# Patient Record
Sex: FEMALE | Race: WHITE | NOT HISPANIC OR LATINO | Employment: FULL TIME | URBAN - METROPOLITAN AREA
[De-identification: names, ages, dates, MRNs, and addresses within clinical notes are randomized per-mention and may not be internally consistent; named-entity substitution may affect disease eponyms.]

---

## 2018-04-14 ENCOUNTER — OFFICE VISIT (OUTPATIENT)
Dept: URGENT CARE | Facility: CLINIC | Age: 33
End: 2018-04-14
Payer: COMMERCIAL

## 2018-04-14 VITALS
BODY MASS INDEX: 25.83 KG/M2 | TEMPERATURE: 99 F | SYSTOLIC BLOOD PRESSURE: 156 MMHG | HEIGHT: 65 IN | OXYGEN SATURATION: 97 % | RESPIRATION RATE: 20 BRPM | WEIGHT: 155 LBS | DIASTOLIC BLOOD PRESSURE: 100 MMHG | HEART RATE: 85 BPM

## 2018-04-14 DIAGNOSIS — N39.0 URINARY TRACT INFECTION WITH HEMATURIA, SITE UNSPECIFIED: Primary | ICD-10-CM

## 2018-04-14 DIAGNOSIS — R30.0 DYSURIA: ICD-10-CM

## 2018-04-14 DIAGNOSIS — R31.9 URINARY TRACT INFECTION WITH HEMATURIA, SITE UNSPECIFIED: Primary | ICD-10-CM

## 2018-04-14 LAB
B-HCG UR QL: NEGATIVE
BILIRUB UR QL STRIP: NEGATIVE
CTP QC/QA: YES
GLUCOSE UR QL STRIP: NEGATIVE
KETONES UR QL STRIP: NEGATIVE
LEUKOCYTE ESTERASE UR QL STRIP: POSITIVE
PH, POC UA: 5.5 (ref 5–8)
POC BLOOD, URINE: POSITIVE
POC NITRATES, URINE: NEGATIVE
PROT UR QL STRIP: POSITIVE
SP GR UR STRIP: 1.02 (ref 1–1.03)
UROBILINOGEN UR STRIP-ACNC: NORMAL (ref 0.1–1.1)

## 2018-04-14 PROCEDURE — 81003 URINALYSIS AUTO W/O SCOPE: CPT | Mod: QW,S$GLB,, | Performed by: NURSE PRACTITIONER

## 2018-04-14 PROCEDURE — 99203 OFFICE O/P NEW LOW 30 MIN: CPT | Mod: 25,S$GLB,, | Performed by: NURSE PRACTITIONER

## 2018-04-14 PROCEDURE — 81025 URINE PREGNANCY TEST: CPT | Mod: S$GLB,,, | Performed by: NURSE PRACTITIONER

## 2018-04-14 RX ORDER — PHENAZOPYRIDINE HYDROCHLORIDE 200 MG/1
200 TABLET, FILM COATED ORAL 3 TIMES DAILY PRN
Qty: 30 TABLET | Refills: 0 | Status: SHIPPED | OUTPATIENT
Start: 2018-04-14 | End: 2019-04-14

## 2018-04-14 RX ORDER — NITROFURANTOIN 25; 75 MG/1; MG/1
100 CAPSULE ORAL 2 TIMES DAILY
Qty: 14 CAPSULE | Refills: 0 | Status: SHIPPED | OUTPATIENT
Start: 2018-04-14 | End: 2018-04-21

## 2018-04-14 NOTE — PATIENT INSTRUCTIONS

## 2018-04-14 NOTE — PROGRESS NOTES
"Subjective:       Patient ID: Sade Mullins is a 32 y.o. female.    Vitals:  height is 5' 5" (1.651 m) and weight is 70.3 kg (155 lb). Her oral temperature is 98.5 °F (36.9 °C). Her blood pressure is 156/100 (abnormal) and her pulse is 85. Her respiration is 20 and oxygen saturation is 97%.     Chief Complaint: Urinary Tract Infection    Urinary Tract Infection    This is a new problem. The current episode started today. The problem occurs every urination. The problem has been gradually worsening. The quality of the pain is described as burning. The pain is at a severity of 8/10. The pain is moderate. There has been no fever. The fever has been present for less than 1 day. She is sexually active. There is no history of pyelonephritis. Associated symptoms include frequency and urgency. Pertinent negatives include no chills, hematuria, nausea or vomiting. She has tried nothing for the symptoms. The treatment provided no relief.     Review of Systems   Constitution: Negative for chills and fever.   Skin: Negative for itching.   Musculoskeletal: Positive for back pain.   Gastrointestinal: Negative for abdominal pain, nausea and vomiting.   Genitourinary: Positive for dysuria, frequency and urgency. Negative for genital sores, hematuria, missed menses and non-menstrual bleeding.       Objective:      Physical Exam   Constitutional: She is oriented to person, place, and time. She appears well-developed and well-nourished.   HENT:   Head: Normocephalic and atraumatic.   Nose: No nasal deformity. No epistaxis.   Mouth/Throat: Mucous membranes are normal.   Eyes: Conjunctivae and lids are normal.   Neck: Trachea normal, normal range of motion and phonation normal. Neck supple.   Cardiovascular: Normal rate, regular rhythm, normal heart sounds and normal pulses.    Pulmonary/Chest: Effort normal and breath sounds normal.   Abdominal: Soft. Normal appearance. She exhibits no distension and no mass. There is no tenderness. " "There is no CVA tenderness.   Genitourinary: No vaginal discharge found.   Neurological: She is alert and oriented to person, place, and time.   Skin: Skin is warm, dry and intact.   Psychiatric: She has a normal mood and affect. Her speech is normal and behavior is normal. Cognition and memory are normal.   Nursing note and vitals reviewed.      Assessment:       1. Urinary tract infection with hematuria, site unspecified    2. Dysuria        Plan:       Patient Instructions     Bladder Infection, Female (Adult)    Urine is normally doesn't have any bacteria in it. But bacteria can get into the urinary tract from the skin around the rectum. Or they can travel in the blood from elsewhere in the body. Once they are in your urinary tract, they can cause infection in the urethra (urethritis), the bladder (cystitis), or the kidneys (pyelonephritis).  The most common place for an infection is in the bladder. This is called a bladder infection. This is one of the most common infections in women. Most bladder infections are easily treated. They are not serious unless the infection spreads to the kidney.  The phrases "bladder infection," "UTI," and "cystitis" are often used to describe the same thing. But they are not always the same. Cystitis is an inflammation of the bladder. The most common cause of cystitis is an infection.  Symptoms  The infection causes inflammation in the urethra and bladder. This causes many of the symptoms. The most common symptoms of a bladder infection are:  · Pain or burning when urinating  · Having to urinate more often than usual  · Urgent need to urinate  · Only a small amount of urine comes out  · Blood in urine  · Abdominal discomfort. This is usually in the lower abdomen above the pubic bone.  · Cloudy urine  · Strong- or bad-smelling urine  · Unable to urinate (urinary retention)  · Unable to hold urine in (urinary incontinence)  · Fever  · Loss of appetite  · Confusion (in older " adults)  Causes  Bladder infections are not contagious. You can't get one from someone else, from a toilet seat, or from sharing a bath.  The most common cause of bladder infections is bacteria from the bowels. The bacteria get onto the skin around the opening of the urethra. From there, they can get into the urine and travel up to the bladder, causing inflammation and infection. This usually happens because of:  · Wiping improperly after urinating. Always wipe from front to back.  · Bowel incontinence  · Pregnancy  · Procedures such as having a catheter inserted  · Older age  · Not emptying your bladder. This can allow bacteria a chance to grow in your urine.  · Dehydration  · Constipation  · Sex  · Use of a diaphragm for birth control   Treatment  Bladder infections are diagnosed by a urine test. They are treated with antibiotics and usually clear up quickly without complications. Treatment helps prevent a more serious kidney infection.  Medicines  Medicines can help in the treatment of a bladder infection:  · Take antibiotics until they are used up, even if you feel better. It is important to finish them to make sure the infection has cleared.  · You can use acetaminophen or ibuprofen for pain, fever, or discomfort, unless another medicine was prescribed. If you have chronic liver or kidney disease, talk with your healthcare provider before using these medicines. Also talk with your provider if you've ever had a stomach ulcer or gastrointestinal bleeding, or are taking blood-thinner medicines.  · If you are given phenazopydridine to reduce burning with urination, it will cause your urine to become a bright orange color. This can stain clothing.  Care and prevention  These self-care steps can help prevent future infections:  · Drink plenty of fluids to prevent dehydration and flush out your bladder. Do this unless you must restrict fluids for other health reasons, or your doctor told you not to.  · Proper cleaning  after going to the bathroom is important. Wipe from front to back after using the toilet to prevent the spread of bacteria.  · Urinate more often. Don't try to hold urine in for a long time.  · Wear loose-fitting clothes and cotton underwear. Avoid tight-fitting pants.  · Improve your diet and prevent constipation. Eat more fresh fruit and vegetables, and fiber, and less junk and fatty foods.  · Avoid sex until your symptoms are gone.  · Avoid caffeine, alcohol, and spicy foods. These can irritate your bladder.  · Urinate right after intercourse to flush out your bladder.  · If you use birth control pills and have frequent bladder infections, discuss it with your doctor.  Follow-up care  Call your healthcare provider if all symptoms are not gone after 3 days of treatment. This is especially important if you have repeat infections.  If a culture was done, you will be told if your treatment needs to be changed. If directed, you can call to find out the results.  If X-rays were done, you will be told if the results will affect your treatment.  Call 911  Call 911 if any of the following occur:  · Trouble breathing  · Hard to wake up or confusion  · Fainting or loss of consciousness  · Rapid heart rate  When to seek medical advice  Call your healthcare provider right away if any of these occur:  · Fever of 100.4ºF (38.0ºC) or higher, or as directed by your healthcare provider  · Symptoms are not better by the third day of treatment  · Back or belly (abdominal) pain that gets worse  · Repeated vomiting, or unable to keep medicine down  · Weakness or dizziness  · Vaginal discharge  · Pain, redness, or swelling in the outer vaginal area (labia)  Date Last Reviewed: 10/1/2016  © 3757-2592 [employer]. [address] [place] [address], [place] [address]. All rights reserved. This information is not intended as a substitute for professional medical care. Always follow your healthcare professional's  instructions.              Urinary tract infection with hematuria, site unspecified    Dysuria  -     POCT Urinalysis, Dipstick, Automated, W/O Scope  -     POCT urine pregnancy  -     CULTURE, URINE    Other orders  -     nitrofurantoin, macrocrystal-monohydrate, (MACROBID) 100 MG capsule; Take 1 capsule (100 mg total) by mouth 2 (two) times daily.  Dispense: 14 capsule; Refill: 0  -     phenazopyridine (PYRIDIUM) 200 MG tablet; Take 1 tablet (200 mg total) by mouth 3 (three) times daily as needed for Pain.  Dispense: 30 tablet; Refill: 0

## 2018-04-20 LAB
BACTERIA UR CULT: ABNORMAL
BACTERIA UR CULT: ABNORMAL
OTHER ANTIBIOTIC SUSC ISLT: ABNORMAL

## 2018-04-22 ENCOUNTER — TELEPHONE (OUTPATIENT)
Dept: URGENT CARE | Facility: CLINIC | Age: 33
End: 2018-04-22

## 2018-04-24 ENCOUNTER — TELEPHONE (OUTPATIENT)
Dept: URGENT CARE | Facility: CLINIC | Age: 33
End: 2018-04-24

## 2018-04-24 NOTE — TELEPHONE ENCOUNTER
----- Message from Caroline Pope MD sent at 4/20/2018  9:43 AM CDT -----  Notify the patient that their  lab tests did result abnormal.Pt is on macrobid. However they  have already been given the appropriate medication and should finish the meds as prescribed

## 2018-06-19 ENCOUNTER — OFFICE VISIT (OUTPATIENT)
Dept: URGENT CARE | Facility: CLINIC | Age: 33
End: 2018-06-19
Payer: COMMERCIAL

## 2018-06-19 VITALS
HEIGHT: 66 IN | DIASTOLIC BLOOD PRESSURE: 98 MMHG | TEMPERATURE: 99 F | SYSTOLIC BLOOD PRESSURE: 132 MMHG | BODY MASS INDEX: 24.27 KG/M2 | OXYGEN SATURATION: 96 % | HEART RATE: 97 BPM | WEIGHT: 151 LBS

## 2018-06-19 DIAGNOSIS — H10.9 CONJUNCTIVITIS, UNSPECIFIED CONJUNCTIVITIS TYPE, UNSPECIFIED LATERALITY: Primary | ICD-10-CM

## 2018-06-19 DIAGNOSIS — H92.02 OTALGIA OF LEFT EAR: ICD-10-CM

## 2018-06-19 PROCEDURE — 99214 OFFICE O/P EST MOD 30 MIN: CPT | Mod: S$GLB,,, | Performed by: EMERGENCY MEDICINE

## 2018-06-19 PROCEDURE — 3008F BODY MASS INDEX DOCD: CPT | Mod: CPTII,S$GLB,, | Performed by: EMERGENCY MEDICINE

## 2018-06-19 RX ORDER — PHENTERMINE HYDROCHLORIDE 15 MG/1
8 CAPSULE ORAL
COMMUNITY
End: 2022-09-02

## 2018-06-19 RX ORDER — ERYTHROMYCIN 5 MG/G
OINTMENT OPHTHALMIC
Qty: 1 TUBE | Refills: 0 | Status: SHIPPED | OUTPATIENT
Start: 2018-06-19 | End: 2022-04-14

## 2018-06-19 RX ORDER — POLYMYXIN B SULFATE AND TRIMETHOPRIM 1; 10000 MG/ML; [USP'U]/ML
SOLUTION OPHTHALMIC
Qty: 1 BOTTLE | Refills: 0 | Status: SHIPPED | OUTPATIENT
Start: 2018-06-19 | End: 2022-04-14 | Stop reason: ALTCHOICE

## 2018-06-19 RX ORDER — ETONOGESTREL AND ETHINYL ESTRADIOL .12; .015 MG/D; MG/D
INSERT, EXTENDED RELEASE VAGINAL
Refills: 12 | COMMUNITY
Start: 2018-04-06 | End: 2022-04-14

## 2018-06-19 NOTE — PATIENT INSTRUCTIONS
EYE   If your condition worsens or fails to improve we recommend that you receive another evaluation at the ER immediately or contact your PCP to discuss your concerns or return here. You must understand that you've received an urgent care treatment only and that you may be released before all your medical problems are known or treated. You the patient will arrange for followup care as instructed.   Use the eye drops every 2 hours while awake initially. Once the eye redness decreases after a few days then decrease the frequency to three times a day. Use the eye drops for 24 hours after the last day of eye symptoms.   Do not wear your contact lens ( if you use them) for at least 5 days after you stop having symptoms and are rechecked by your doctor. Throw away the contacts, contact solution and carrying case you were using and start with new material.   If you develop increase eye symptoms or change in your vision seek medical care immediately either with your ophthalomologist or the ER or return here.   If you just need something for nasal drainage zyrtec, claritin or allegra (generics ok) just use these meds. You can also add flonase nasal spray if you have no contraindication to using it. If you are flying it would be good to get some afrin nose drops just for the plane trip. Use it after you are seat belting in and upon the descent if it is a long flight  If you have more stopped up sensation then use a decongestant like pseudoephedrine that you sign for behind the counter. You don't need a prescription. Do not use the phenylephrine/sudafed that is available on the shelf.  If you have both drainage and stopped up sinus sensation then you can get zyrtec D, claritin D or allegra D which you have to sign for behind the counter (generic ok).  If you have had allergies or side effects from using these meds in the past or contraindications like hypertension then do not use  the decongestants.  These choices are better than the combination cold medications on the shelf

## 2018-06-19 NOTE — PROGRESS NOTES
"Subjective:       Patient ID: Sade Mullins is a 32 y.o. female.    Vitals:  height is 5' 5.5" (1.664 m) and weight is 68.5 kg (151 lb). Her temperature is 98.6 °F (37 °C). Her blood pressure is 132/98 (abnormal) and her pulse is 97. Her oxygen saturation is 96%.     Chief Complaint: Otalgia and Eye Pain (redness)    Right eye redness and crusting since yesterday. She wears contacts weekly and changed them Sunday. She doesn't sleep with them.   She has had problems with left ear popping intermittently for one month and now slightly painful. She is flying to [place] in a week and wanted it checked. No other URI sxs      Otalgia    There is pain in the left ear. This is a new problem. The current episode started in the past 7 days (2 days ago). The problem occurs constantly. The problem has been gradually worsening. There has been no fever. The pain is at a severity of 4/10. The pain is mild. Pertinent negatives include no headaches or vomiting. She has tried nothing for the symptoms. The treatment provided no relief.   Eye Pain    The right eye is affected. This is a new problem. The current episode started yesterday. The problem occurs constantly. The problem has been gradually worsening. There was no injury mechanism. The pain is at a severity of 4/10. The pain is moderate. There is known exposure to pink eye. She wears contacts. Associated symptoms include eye redness. Pertinent negatives include no blurred vision, fever, nausea, photophobia or vomiting. She has tried nothing for the symptoms. The treatment provided no relief.     Review of Systems   Constitution: Negative for chills and fever.   HENT: Positive for ear pain. Negative for congestion.         Left ear   Eyes: Positive for pain and redness. Negative for blurred vision and photophobia.   Gastrointestinal: Negative for nausea and vomiting.   Neurological: Negative for headaches.   All other systems reviewed and are negative.      Objective:    "   Physical Exam   Constitutional: She is oriented to person, place, and time. She appears well-developed and well-nourished. She is cooperative.  Non-toxic appearance. She does not appear ill. No distress.   HENT:   Head: Normocephalic and atraumatic.   Right Ear: Hearing, external ear and ear canal normal. Tympanic membrane is retracted.   Left Ear: Hearing, external ear and ear canal normal. Tympanic membrane is retracted.   Nose: Nose normal. No mucosal edema, rhinorrhea or nasal deformity. No epistaxis. Right sinus exhibits no maxillary sinus tenderness and no frontal sinus tenderness. Left sinus exhibits no maxillary sinus tenderness and no frontal sinus tenderness.   Mouth/Throat: Uvula is midline, oropharynx is clear and moist and mucous membranes are normal. No trismus in the jaw. Normal dentition. No uvula swelling. No posterior oropharyngeal erythema.   Both TMs retracted and although it is the left one that bothers her it is the right one that is slightly injected over the malleus. No fluid. Light reflex normal   Eyes: EOM and lids are normal. Pupils are equal, round, and reactive to light. Lids are everted and swept, no foreign bodies found. Right eye exhibits discharge. Left eye exhibits no discharge. Right conjunctiva is injected. No scleral icterus.   Sclera clear bilat  Neg fluorescein and no fb   Neck: Trachea normal, normal range of motion, full passive range of motion without pain and phonation normal. Neck supple.   Cardiovascular: Normal rate, regular rhythm, normal heart sounds, intact distal pulses and normal pulses.    Pulmonary/Chest: Effort normal and breath sounds normal. No respiratory distress.   Abdominal: Soft. Normal appearance and bowel sounds are normal. She exhibits no distension, no pulsatile midline mass and no mass. There is no tenderness.   Musculoskeletal: Normal range of motion. She exhibits no edema or deformity.   Neurological: She is alert and oriented to person, place,  and time. She exhibits normal muscle tone. Coordination normal.   Skin: Skin is warm, dry and intact. She is not diaphoretic. No pallor.   Psychiatric: She has a normal mood and affect. Her speech is normal and behavior is normal. Judgment and thought content normal. Cognition and memory are normal.   Nursing note and vitals reviewed.    VA 20/13 OD 20/13 OS 20/13 OU  Assessment:       1. Conjunctivitis, unspecified conjunctivitis type, unspecified laterality    2. Otalgia of left ear        Plan:         Conjunctivitis, unspecified conjunctivitis type, unspecified laterality    Otalgia of left ear    Other orders  -     erythromycin (ROMYCIN) ophthalmic ointment; Apply to affected eye nightly  Dispense: 1 Tube; Refill: 0  -     polymyxin B sulf-trimethoprim (POLYTRIM) 10,000 unit- 1 mg/mL Drop; 1 drop every 2 hours while awake  Dispense: 1 Bottle; Refill: 0          Patient Instructions                                               EYE   If your condition worsens or fails to improve we recommend that you receive another evaluation at the ER immediately or contact your PCP to discuss your concerns or return here. You must understand that you've received an urgent care treatment only and that you may be released before all your medical problems are known or treated. You the patient will arrange for followup care as instructed.   Use the eye drops every 2 hours while awake initially. Once the eye redness decreases after a few days then decrease the frequency to three times a day. Use the eye drops for 24 hours after the last day of eye symptoms.   Do not wear your contact lens ( if you use them) for at least 5 days after you stop having symptoms and are rechecked by your doctor. Throw away the contacts, contact solution and carrying case you were using and start with new material.   If you develop increase eye symptoms or change in your vision seek medical care immediately either with your ophthalomologist or the ER  or return here.   If you just need something for nasal drainage zyrtec, claritin or allegra (generics ok) just use these meds. You can also add flonase nasal spray if you have no contraindication to using it. If you are flying it would be good to get some afrin nose drops just for the plane trip. Use it after you are seat belting in and upon the descent if it is a long flight  If you have more stopped up sensation then use a decongestant like pseudoephedrine that you sign for behind the counter. You don't need a prescription. Do not use the phenylephrine/sudafed that is available on the shelf.  If you have both drainage and stopped up sinus sensation then you can get zyrtec D, claritin D or allegra D which you have to sign for behind the counter (generic ok).  If you have had allergies or side effects from using these meds in the past or contraindications like hypertension then do not use the decongestants.  These choices are better than the combination cold medications on the shelf

## 2018-06-22 ENCOUNTER — TELEPHONE (OUTPATIENT)
Dept: URGENT CARE | Facility: CLINIC | Age: 33
End: 2018-06-22

## 2019-10-31 ENCOUNTER — OFFICE VISIT (OUTPATIENT)
Dept: URGENT CARE | Facility: CLINIC | Age: 34
End: 2019-10-31
Payer: COMMERCIAL

## 2019-10-31 ENCOUNTER — HOSPITAL ENCOUNTER (OUTPATIENT)
Dept: RADIOLOGY | Facility: OTHER | Age: 34
Discharge: HOME | End: 2019-10-31
Attending: NURSE PRACTITIONER
Payer: COMMERCIAL

## 2019-10-31 VITALS
HEART RATE: 76 BPM | BODY MASS INDEX: 24.27 KG/M2 | DIASTOLIC BLOOD PRESSURE: 103 MMHG | WEIGHT: 151 LBS | SYSTOLIC BLOOD PRESSURE: 160 MMHG | RESPIRATION RATE: 16 BRPM | HEIGHT: 66 IN | OXYGEN SATURATION: 99 %

## 2019-10-31 DIAGNOSIS — S30.1XXA CONTUSION OF ABDOMINAL WALL, INITIAL ENCOUNTER: ICD-10-CM

## 2019-10-31 DIAGNOSIS — R03.0 ELEVATED BP WITHOUT DIAGNOSIS OF HYPERTENSION: ICD-10-CM
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
## 2019-10-31 DIAGNOSIS — S00.83XA CONTUSION OF FACE, INITIAL ENCOUNTER: ICD-10-CM

## 2019-10-31 DIAGNOSIS — S60.222A CONTUSION OF LEFT HAND, INITIAL ENCOUNTER: ICD-10-CM
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
## 2019-10-31 DIAGNOSIS — G44.209 TENSION HEADACHE: ICD-10-CM

## 2019-10-31 LAB
B-HCG UR QL: NEGATIVE
BILIRUB UR QL STRIP: NEGATIVE
CTP QC/QA: YES
GLUCOSE UR QL STRIP: NEGATIVE
KETONES UR QL STRIP: POSITIVE
LEUKOCYTE ESTERASE UR QL STRIP: NEGATIVE
PH, POC UA: 6 (ref 5–8)
POC BLOOD, URINE: NEGATIVE
POC NITRATES, URINE: NEGATIVE
PROT UR QL STRIP: POSITIVE
SP GR UR STRIP: 1.02 (ref 1–1.03)
UROBILINOGEN UR STRIP-ACNC: ABNORMAL (ref 0.1–1.1)

## 2019-10-31 PROCEDURE — 70150 X-RAY EXAM OF FACIAL BONES: CPT | Mod: S$GLB,,, | Performed by: RADIOLOGY

## 2019-10-31 PROCEDURE — 71250 CT THORAX DX C-: CPT | Mod: 26,,, | Performed by: RADIOLOGY

## 2019-10-31 PROCEDURE — 81003 URINALYSIS AUTO W/O SCOPE: CPT | Mod: QW,S$GLB,, | Performed by: NURSE PRACTITIONER

## 2019-10-31 PROCEDURE — 71250 CT CHEST ABDOMEN PELVIS WITHOUT CONTRAST(XPD): ICD-10-PCS | Mod: 26,,, | Performed by: RADIOLOGY

## 2019-10-31 PROCEDURE — 81025 POCT URINE PREGNANCY: ICD-10-PCS | Mod: S$GLB,,, | Performed by: NURSE PRACTITIONER

## 2019-10-31 PROCEDURE — 99214 OFFICE O/P EST MOD 30 MIN: CPT | Mod: 25,S$GLB,, | Performed by: NURSE PRACTITIONER

## 2019-10-31 PROCEDURE — 96372 PR INJECTION,THERAP/PROPH/DIAG2ST, IM OR SUBCUT: ICD-10-PCS | Mod: S$GLB,,, | Performed by: EMERGENCY MEDICINE

## 2019-10-31 PROCEDURE — 96372 THER/PROPH/DIAG INJ SC/IM: CPT | Mod: S$GLB,,, | Performed by: EMERGENCY MEDICINE

## 2019-10-31 PROCEDURE — 3008F PR BODY MASS INDEX (BMI) DOCUMENTED: ICD-10-PCS | Mod: CPTII,S$GLB,, | Performed by: NURSE PRACTITIONER

## 2019-10-31 PROCEDURE — 3008F BODY MASS INDEX DOCD: CPT | Mod: CPTII,S$GLB,, | Performed by: NURSE PRACTITIONER

## 2019-10-31 PROCEDURE — 81003 POCT URINALYSIS, DIPSTICK, AUTOMATED, W/O SCOPE: ICD-10-PCS | Mod: QW,S$GLB,, | Performed by: NURSE PRACTITIONER

## 2019-10-31 PROCEDURE — 73130 XR HAND COMPLETE 3 VIEW LEFT: ICD-10-PCS | Mod: LT,S$GLB,, | Performed by: RADIOLOGY

## 2019-10-31 PROCEDURE — 74176 CT ABD & PELVIS W/O CONTRAST: CPT | Mod: TC

## 2019-10-31 PROCEDURE — 73130 X-RAY EXAM OF HAND: CPT | Mod: LT,S$GLB,, | Performed by: RADIOLOGY

## 2019-10-31 PROCEDURE — 99214 PR OFFICE/OUTPT VISIT, EST, LEVL IV, 30-39 MIN: ICD-10-PCS | Mod: 25,S$GLB,, | Performed by: NURSE PRACTITIONER

## 2019-10-31 PROCEDURE — 74176 CT CHEST ABDOMEN PELVIS WITHOUT CONTRAST(XPD): ICD-10-PCS | Mod: 26,,, | Performed by: RADIOLOGY

## 2019-10-31 PROCEDURE — 71250 CT THORAX DX C-: CPT | Mod: TC

## 2019-10-31 PROCEDURE — 81025 URINE PREGNANCY TEST: CPT | Mod: S$GLB,,, | Performed by: NURSE PRACTITIONER

## 2019-10-31 PROCEDURE — 70150 XR FACIAL BONES 3 OR MORE VIEW: ICD-10-PCS | Mod: S$GLB,,, | Performed by: RADIOLOGY

## 2019-10-31 PROCEDURE — 74176 CT ABD & PELVIS W/O CONTRAST: CPT | Mod: 26,,, | Performed by: RADIOLOGY

## 2019-10-31 RX ORDER — MELOXICAM 15 MG/1
15 TABLET ORAL DAILY
Qty: 30 TABLET | Refills: 0 | Status: SHIPPED | OUTPATIENT
Start: 2019-10-31 | End: 2022-04-14

## 2019-10-31 RX ORDER — CYCLOBENZAPRINE HCL 10 MG
10 TABLET ORAL 3 TIMES DAILY PRN
Qty: 21 TABLET | Refills: 0 | Status: SHIPPED | OUTPATIENT
Start: 2019-10-31 | End: 2019-11-07

## 2019-10-31 RX ORDER — BUTALBITAL, ACETAMINOPHEN AND CAFFEINE 50; 325; 40 MG/1; MG/1; MG/1
1 TABLET ORAL EVERY 6 HOURS PRN
Qty: 16 TABLET | Refills: 0 | Status: SHIPPED | OUTPATIENT
Start: 2019-10-31 | End: 2019-11-30

## 2019-10-31 RX ORDER — KETOROLAC TROMETHAMINE 30 MG/ML
30 INJECTION, SOLUTION INTRAMUSCULAR; INTRAVENOUS
Status: COMPLETED | OUTPATIENT
Start: 2019-10-31 | End: 2019-10-31

## 2019-10-31 RX ADMIN — KETOROLAC TROMETHAMINE 30 MG: 30 INJECTION, SOLUTION INTRAMUSCULAR; INTRAVENOUS at 02:10
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
## 2019-10-31 NOTE — LETTER
October 31, 2019      [facility] [address] [address]  Phone: [phone]  Fax: [phone]       Patient: Sade Mullins   YOB: 1985  Date of Visit: 10/31/2019    To Whom It May Concern:    Frank Mullins  was at [facility] on 10/31/2019. She may return to work/school on 11/04/19 with no restrictions. If you have any questions or concerns, or if I can be of further assistance, please do not hesitate to contact me.    Sincerely,    Radha Connell NP

## 2019-11-01 ENCOUNTER — TELEPHONE (OUTPATIENT)
Dept: URGENT CARE | Facility: CLINIC | Age: 34
End: 2019-11-01

## 2020-12-10 ENCOUNTER — CLINICAL SUPPORT (OUTPATIENT)
Dept: URGENT CARE | Facility: CLINIC | Age: 35
End: 2020-12-10
Payer: COMMERCIAL

## 2020-12-10 DIAGNOSIS — Z11.59 SCREENING FOR VIRAL DISEASE: Primary | ICD-10-CM

## 2020-12-10 LAB
CTP QC/QA: YES
SARS-COV-2 RDRP RESP QL NAA+PROBE: NEGATIVE

## 2020-12-10 PROCEDURE — U0002 COVID-19 LAB TEST NON-CDC: HCPCS | Mod: QW,S$GLB,, | Performed by: NURSE PRACTITIONER

## 2020-12-10 PROCEDURE — U0002: ICD-10-PCS | Mod: QW,S$GLB,, | Performed by: NURSE PRACTITIONER

## 2020-12-11 ENCOUNTER — CLINICAL SUPPORT (OUTPATIENT)
Dept: URGENT CARE | Facility: CLINIC | Age: 35
End: 2020-12-11
Payer: COMMERCIAL

## 2020-12-11 DIAGNOSIS — Z11.59 SCREENING FOR VIRAL DISEASE: Primary | ICD-10-CM

## 2020-12-11 LAB
CTP QC/QA: YES
SARS-COV-2 RDRP RESP QL NAA+PROBE: NEGATIVE

## 2020-12-11 PROCEDURE — U0002 COVID-19 LAB TEST NON-CDC: HCPCS | Mod: QW,S$GLB,, | Performed by: PHYSICIAN ASSISTANT

## 2020-12-11 PROCEDURE — U0002: ICD-10-PCS | Mod: QW,S$GLB,, | Performed by: PHYSICIAN ASSISTANT

## 2020-12-17 ENCOUNTER — CLINICAL SUPPORT (OUTPATIENT)
Dept: URGENT CARE | Facility: CLINIC | Age: 35
End: 2020-12-17
Payer: COMMERCIAL

## 2020-12-17 DIAGNOSIS — Z11.59 SCREENING FOR VIRAL DISEASE: Primary | ICD-10-CM

## 2020-12-17 LAB
CTP QC/QA: YES
SARS-COV-2 RDRP RESP QL NAA+PROBE: NEGATIVE

## 2020-12-17 PROCEDURE — U0002: ICD-10-PCS | Mod: QW,S$GLB,, | Performed by: PHYSICIAN ASSISTANT

## 2020-12-17 PROCEDURE — U0002 COVID-19 LAB TEST NON-CDC: HCPCS | Mod: QW,S$GLB,, | Performed by: PHYSICIAN ASSISTANT

## 2020-12-17 NOTE — PATIENT INSTRUCTIONS
NEG COVID QUARANTINE:     You have tested negative for COVID-19 today.  If you did not have any close exposure as defined below, then effective today, you can return to your normal daily activities including social distancing, wearing masks, and frequent handwashing.     A close exposure is defined as anyone who had a masked or an unmasked exposure to a known COVID -19 positive person, at less than 6 ft for more than 15 minutes.  If your exposure meets this definition, then you are required to quarantine for 14 days per the CDC.     The 14 day quarantine begins from the day you were exposed, not the day of your test.  For example, if your exposure was on a Monday, and you waited until Friday of the same week to get tested and it was negative, your 14 day quarantine begins from that Monday, not the Friday you tested negative.     If you developed symptoms since the exposure, and your test was negative today, you still have to quarantine for 14 days from the date of the exposure.     So if you meet the definition of a close exposure, A NEGATIVE TEST DOES NOT GET YOU OUT OF 14 DAYS OF QUARANTINE!

## 2021-06-21 ENCOUNTER — OFFICE VISIT (OUTPATIENT)
Dept: URGENT CARE | Facility: CLINIC | Age: 36
End: 2021-06-21
Payer: COMMERCIAL

## 2021-06-21 VITALS
BODY MASS INDEX: 25.71 KG/M2 | HEART RATE: 98 BPM | WEIGHT: 160 LBS | OXYGEN SATURATION: 98 % | DIASTOLIC BLOOD PRESSURE: 97 MMHG | HEIGHT: 66 IN | RESPIRATION RATE: 16 BRPM | SYSTOLIC BLOOD PRESSURE: 137 MMHG | TEMPERATURE: 98 F

## 2021-06-21 DIAGNOSIS — J00 NASOPHARYNGITIS: Primary | ICD-10-CM

## 2021-06-21 PROCEDURE — 96372 THER/PROPH/DIAG INJ SC/IM: CPT | Mod: S$GLB,,, | Performed by: PHYSICIAN ASSISTANT

## 2021-06-21 PROCEDURE — 96372 PR INJECTION,THERAP/PROPH/DIAG2ST, IM OR SUBCUT: ICD-10-PCS | Mod: S$GLB,,, | Performed by: PHYSICIAN ASSISTANT

## 2021-06-21 PROCEDURE — 99213 OFFICE O/P EST LOW 20 MIN: CPT | Mod: 25,S$GLB,, | Performed by: PHYSICIAN ASSISTANT

## 2021-06-21 PROCEDURE — 99213 PR OFFICE/OUTPT VISIT, EST, LEVL III, 20-29 MIN: ICD-10-PCS | Mod: 25,S$GLB,, | Performed by: PHYSICIAN ASSISTANT

## 2021-06-21 PROCEDURE — 3008F PR BODY MASS INDEX (BMI) DOCUMENTED: ICD-10-PCS | Mod: CPTII,S$GLB,, | Performed by: PHYSICIAN ASSISTANT

## 2021-06-21 PROCEDURE — 3008F BODY MASS INDEX DOCD: CPT | Mod: CPTII,S$GLB,, | Performed by: PHYSICIAN ASSISTANT

## 2021-06-21 RX ORDER — ESCITALOPRAM OXALATE 10 MG/1
TABLET ORAL
COMMUNITY
Start: 2021-03-17 | End: 2022-04-14

## 2021-06-21 RX ORDER — DEXAMETHASONE SODIUM PHOSPHATE 100 MG/10ML
8 INJECTION INTRAMUSCULAR; INTRAVENOUS
Status: COMPLETED | OUTPATIENT
Start: 2021-06-21 | End: 2021-06-21

## 2021-06-21 RX ADMIN — DEXAMETHASONE SODIUM PHOSPHATE 8 MG: 100 INJECTION INTRAMUSCULAR; INTRAVENOUS at 04:06

## 2021-12-04 ENCOUNTER — OFFICE VISIT (OUTPATIENT)
Dept: URGENT CARE | Facility: CLINIC | Age: 36
End: 2021-12-04
Payer: COMMERCIAL

## 2021-12-04 VITALS
RESPIRATION RATE: 18 BRPM | HEIGHT: 66 IN | SYSTOLIC BLOOD PRESSURE: 134 MMHG | WEIGHT: 170 LBS | OXYGEN SATURATION: 99 % | BODY MASS INDEX: 27.32 KG/M2 | HEART RATE: 83 BPM | TEMPERATURE: 98 F | DIASTOLIC BLOOD PRESSURE: 104 MMHG

## 2021-12-04 DIAGNOSIS — J02.9 SORE THROAT: ICD-10-CM

## 2021-12-04 DIAGNOSIS — R09.81 SINUS CONGESTION: ICD-10-CM

## 2021-12-04 DIAGNOSIS — J06.9 VIRAL URI WITH COUGH: Primary | ICD-10-CM

## 2021-12-04 LAB
CTP QC/QA: YES
CTP QC/QA: YES
MOLECULAR STREP A: NEGATIVE
SARS-COV-2 RDRP RESP QL NAA+PROBE: NEGATIVE

## 2021-12-04 PROCEDURE — U0002: ICD-10-PCS | Mod: QW,S$GLB,, | Performed by: NURSE PRACTITIONER

## 2021-12-04 PROCEDURE — 87651 POCT STREP A MOLECULAR: ICD-10-PCS | Mod: QW,S$GLB,, | Performed by: NURSE PRACTITIONER

## 2021-12-04 PROCEDURE — 99213 PR OFFICE/OUTPT VISIT, EST, LEVL III, 20-29 MIN: ICD-10-PCS | Mod: S$GLB,,, | Performed by: NURSE PRACTITIONER

## 2021-12-04 PROCEDURE — U0002 COVID-19 LAB TEST NON-CDC: HCPCS | Mod: QW,S$GLB,, | Performed by: NURSE PRACTITIONER

## 2021-12-04 PROCEDURE — 87651 STREP A DNA AMP PROBE: CPT | Mod: QW,S$GLB,, | Performed by: NURSE PRACTITIONER

## 2021-12-04 PROCEDURE — 99213 OFFICE O/P EST LOW 20 MIN: CPT | Mod: S$GLB,,, | Performed by: NURSE PRACTITIONER

## 2021-12-04 RX ORDER — PROMETHAZINE HYDROCHLORIDE AND DEXTROMETHORPHAN HYDROBROMIDE 6.25; 15 MG/5ML; MG/5ML
5 SYRUP ORAL NIGHTLY PRN
Qty: 120 ML | Refills: 0 | Status: SHIPPED | OUTPATIENT
Start: 2021-12-04 | End: 2022-04-14

## 2022-01-06 ENCOUNTER — IMMUNIZATION (OUTPATIENT)
Dept: INTERNAL MEDICINE | Facility: CLINIC | Age: 37
End: 2022-01-06
Payer: COMMERCIAL

## 2022-01-06 DIAGNOSIS — Z23 NEED FOR VACCINATION: Primary | ICD-10-CM

## 2022-01-06 PROCEDURE — 0004A COVID-19, MRNA, LNP-S, PF, 30 MCG/0.3 ML DOSE VACCINE: CPT | Mod: PBBFAC | Performed by: INTERNAL MEDICINE

## 2022-01-18 ENCOUNTER — LAB VISIT (OUTPATIENT)
Dept: PRIMARY CARE CLINIC | Facility: CLINIC | Age: 37
End: 2022-01-18
Payer: COMMERCIAL

## 2022-01-18 DIAGNOSIS — Z20.822 CONTACT WITH AND (SUSPECTED) EXPOSURE TO COVID-19: ICD-10-CM

## 2022-01-18 LAB
CTP QC/QA: YES
SARS-COV-2 AG RESP QL IA.RAPID: NEGATIVE

## 2022-01-18 PROCEDURE — 87811 SARS-COV-2 COVID19 W/OPTIC: CPT

## 2022-04-14 ENCOUNTER — OFFICE VISIT (OUTPATIENT)
Dept: PRIMARY CARE CLINIC | Facility: CLINIC | Age: 37
End: 2022-04-14
Payer: COMMERCIAL

## 2022-04-14 DIAGNOSIS — H10.33 ACUTE BACTERIAL CONJUNCTIVITIS OF BOTH EYES: Primary | ICD-10-CM

## 2022-04-14 PROCEDURE — 1160F RVW MEDS BY RX/DR IN RCRD: CPT | Mod: CPTII,95,, | Performed by: FAMILY MEDICINE

## 2022-04-14 PROCEDURE — 99212 OFFICE O/P EST SF 10 MIN: CPT | Mod: 95,,, | Performed by: FAMILY MEDICINE

## 2022-04-14 PROCEDURE — 99212 PR OFFICE/OUTPT VISIT, EST, LEVL II, 10-19 MIN: ICD-10-PCS | Mod: 95,,, | Performed by: FAMILY MEDICINE

## 2022-04-14 PROCEDURE — 1159F MED LIST DOCD IN RCRD: CPT | Mod: CPTII,95,, | Performed by: FAMILY MEDICINE

## 2022-04-14 PROCEDURE — 1159F PR MEDICATION LIST DOCUMENTED IN MEDICAL RECORD: ICD-10-PCS | Mod: CPTII,95,, | Performed by: FAMILY MEDICINE

## 2022-04-14 PROCEDURE — 1160F PR REVIEW ALL MEDS BY PRESCRIBER/CLIN PHARMACIST DOCUMENTED: ICD-10-PCS | Mod: CPTII,95,, | Performed by: FAMILY MEDICINE

## 2022-04-14 RX ORDER — POLYMYXIN B SULFATE AND TRIMETHOPRIM 1; 10000 MG/ML; [USP'U]/ML
1 SOLUTION OPHTHALMIC EVERY 4 HOURS
Qty: 10 ML | Refills: 0 | Status: SHIPPED | OUTPATIENT
Start: 2022-04-14 | End: 2022-04-21

## 2022-04-14 NOTE — PROGRESS NOTES
The patient location is: [place]  The chief complaint leading to consultation is: pink eye    Visit type: audiovisual    Face to Face time with patient:   3 minutes of total time spent on the encounter, which includes face to face time and non-face to face time preparing to see the patient (eg, review of tests), Obtaining and/or reviewing separately obtained history, Documenting clinical information in the electronic or other health record, Independently interpreting results (not separately reported) and communicating results to the patient/family/caregiver, or Care coordination (not separately reported).         Each patient to whom he or she provides medical services by telemedicine is:  (1) informed of the relationship between the physician and patient and the respective role of any other health care provider with respect to management of the patient; and (2) notified that he or she may decline to receive medical services by telemedicine and may withdraw from such care at any time.    Notes:   History of present illness:  Patient is a 36-year-old female who woke this morning with redness of both eyes and crusting of her left eye.  She reports mild discomfort involving the left eye.  She wears contacts.  She denies visual changes.  She does report chronic allergy symptoms.    Review of system:  No photophobia.  No cough or nasal congestion.  Physical exam:  General:  Alert, no acute distress  Eyes:  Patient has injected sclera involving both eyes.  Mild swelling of the left eyelid.    A/P:  Diagnoses and all orders for this visit:    Acute bacterial conjunctivitis of both eyes  -     polymyxin B sulf-trimethoprim (POLYTRIM) 10,000 unit- 1 mg/mL Drop; Place 1 drop into both eyes every 4 (four) hours. for 7 days  Patient advised to discard her contacts as well as eye makeup.  Recommended follow-up immediately if she develops severe eye pain, changes in her vision or photosensitivity.  Follow-up otherwise if her symptoms  does not improve within 2-3 days.

## 2022-08-29 ENCOUNTER — OFFICE VISIT (OUTPATIENT)
Dept: URGENT CARE | Facility: CLINIC | Age: 37
End: 2022-08-29
Payer: COMMERCIAL

## 2022-08-29 VITALS
BODY MASS INDEX: 27.32 KG/M2 | TEMPERATURE: 98 F | DIASTOLIC BLOOD PRESSURE: 93 MMHG | RESPIRATION RATE: 17 BRPM | WEIGHT: 170 LBS | HEART RATE: 77 BPM | OXYGEN SATURATION: 100 % | SYSTOLIC BLOOD PRESSURE: 145 MMHG | HEIGHT: 66 IN

## 2022-08-29 DIAGNOSIS — S39.012A BACK STRAIN, INITIAL ENCOUNTER: ICD-10-CM

## 2022-08-29 DIAGNOSIS — M54.50 LOW BACK PAIN WITHOUT SCIATICA, UNSPECIFIED BACK PAIN LATERALITY, UNSPECIFIED CHRONICITY: Primary | ICD-10-CM

## 2022-08-29 DIAGNOSIS — I10 HYPERTENSION, UNSPECIFIED TYPE: ICD-10-CM

## 2022-08-29 LAB
B-HCG UR QL: NEGATIVE
BILIRUB UR QL STRIP: NEGATIVE
CTP QC/QA: YES
GLUCOSE UR QL STRIP: NEGATIVE
KETONES UR QL STRIP: NEGATIVE
LEUKOCYTE ESTERASE UR QL STRIP: NEGATIVE
PH, POC UA: 5 (ref 5–8)
POC BLOOD, URINE: NEGATIVE
POC NITRATES, URINE: NEGATIVE
PROT UR QL STRIP: NEGATIVE
SP GR UR STRIP: 1.02 (ref 1–1.03)
UROBILINOGEN UR STRIP-ACNC: NORMAL (ref 0.1–1.1)

## 2022-08-29 PROCEDURE — 1159F MED LIST DOCD IN RCRD: CPT | Mod: CPTII,S$GLB,, | Performed by: FAMILY MEDICINE

## 2022-08-29 PROCEDURE — 81003 POCT URINALYSIS, DIPSTICK, MANUAL, W/O SCOPE: ICD-10-PCS | Mod: QW,S$GLB,, | Performed by: FAMILY MEDICINE

## 2022-08-29 PROCEDURE — 81025 URINE PREGNANCY TEST: CPT | Mod: S$GLB,,, | Performed by: FAMILY MEDICINE

## 2022-08-29 PROCEDURE — 96372 PR INJECTION,THERAP/PROPH/DIAG2ST, IM OR SUBCUT: ICD-10-PCS | Mod: S$GLB,,, | Performed by: FAMILY MEDICINE

## 2022-08-29 PROCEDURE — 3008F BODY MASS INDEX DOCD: CPT | Mod: CPTII,S$GLB,, | Performed by: FAMILY MEDICINE

## 2022-08-29 PROCEDURE — 3008F PR BODY MASS INDEX (BMI) DOCUMENTED: ICD-10-PCS | Mod: CPTII,S$GLB,, | Performed by: FAMILY MEDICINE

## 2022-08-29 PROCEDURE — 96372 THER/PROPH/DIAG INJ SC/IM: CPT | Mod: S$GLB,,, | Performed by: FAMILY MEDICINE

## 2022-08-29 PROCEDURE — 81025 POCT URINE PREGNANCY: ICD-10-PCS | Mod: S$GLB,,, | Performed by: FAMILY MEDICINE

## 2022-08-29 PROCEDURE — 99214 PR OFFICE/OUTPT VISIT, EST, LEVL IV, 30-39 MIN: ICD-10-PCS | Mod: 25,S$GLB,, | Performed by: FAMILY MEDICINE

## 2022-08-29 PROCEDURE — 99214 OFFICE O/P EST MOD 30 MIN: CPT | Mod: 25,S$GLB,, | Performed by: FAMILY MEDICINE

## 2022-08-29 PROCEDURE — 81003 URINALYSIS AUTO W/O SCOPE: CPT | Mod: QW,S$GLB,, | Performed by: FAMILY MEDICINE

## 2022-08-29 PROCEDURE — 3077F PR MOST RECENT SYSTOLIC BLOOD PRESSURE >= 140 MM HG: ICD-10-PCS | Mod: CPTII,S$GLB,, | Performed by: FAMILY MEDICINE

## 2022-08-29 PROCEDURE — 1159F PR MEDICATION LIST DOCUMENTED IN MEDICAL RECORD: ICD-10-PCS | Mod: CPTII,S$GLB,, | Performed by: FAMILY MEDICINE

## 2022-08-29 PROCEDURE — 3080F PR MOST RECENT DIASTOLIC BLOOD PRESSURE >= 90 MM HG: ICD-10-PCS | Mod: CPTII,S$GLB,, | Performed by: FAMILY MEDICINE

## 2022-08-29 PROCEDURE — 3080F DIAST BP >= 90 MM HG: CPT | Mod: CPTII,S$GLB,, | Performed by: FAMILY MEDICINE

## 2022-08-29 PROCEDURE — 1160F RVW MEDS BY RX/DR IN RCRD: CPT | Mod: CPTII,S$GLB,, | Performed by: FAMILY MEDICINE

## 2022-08-29 PROCEDURE — 3077F SYST BP >= 140 MM HG: CPT | Mod: CPTII,S$GLB,, | Performed by: FAMILY MEDICINE

## 2022-08-29 PROCEDURE — 1160F PR REVIEW ALL MEDS BY PRESCRIBER/CLIN PHARMACIST DOCUMENTED: ICD-10-PCS | Mod: CPTII,S$GLB,, | Performed by: FAMILY MEDICINE

## 2022-08-29 RX ORDER — NAPROXEN 500 MG/1
500 TABLET ORAL 2 TIMES DAILY WITH MEALS
Qty: 20 TABLET | Refills: 0 | Status: SHIPPED | OUTPATIENT
Start: 2022-08-29 | End: 2022-09-08

## 2022-08-29 RX ORDER — KETOROLAC TROMETHAMINE 30 MG/ML
30 INJECTION, SOLUTION INTRAMUSCULAR; INTRAVENOUS
Status: COMPLETED | OUTPATIENT
Start: 2022-08-29 | End: 2022-08-29

## 2022-08-29 RX ORDER — METHOCARBAMOL 500 MG/1
500 TABLET, FILM COATED ORAL 3 TIMES DAILY
Qty: 20 TABLET | Refills: 0 | Status: SHIPPED | OUTPATIENT
Start: 2022-08-29 | End: 2022-09-02

## 2022-08-29 RX ADMIN — KETOROLAC TROMETHAMINE 30 MG: 30 INJECTION, SOLUTION INTRAMUSCULAR; INTRAVENOUS at 06:08

## 2022-08-29 NOTE — PROGRESS NOTES
"Subjective:       Patient ID: Sade Mullins is a 37 y.o. female.    Vitals:  height is 5' 5.51" (1.664 m) and weight is 77.1 kg (170 lb). Her tympanic temperature is 98.3 °F (36.8 °C). Her blood pressure is 145/93 (abnormal) and her pulse is 77. Her respiration is 17 and oxygen saturation is 100%.     Chief Complaint: Back Pain    Patient presents with c/o back pain for 5 days. Pain is located to lower back, reports no radiation, intensity varies, at present 8/10, increased with back movements. Denies any radiating leg pain, paresthesias, weakness, saddle anesthesia or Bowel/Bladder dysfunction. Denies fever, dysuria, abdominal pain, N/V.          Back Pain  This is a new problem. The current episode started in the past 7 days. The problem has been gradually worsening since onset. The pain is present in the lumbar spine. The quality of the pain is described as aching. The pain does not radiate. The pain is at a severity of 10/10. The pain is severe. The pain is The same all the time. Exacerbated by: Moving. Pertinent negatives include no abdominal pain, bladder incontinence, bowel incontinence, chest pain, dysuria, fever, headaches, leg pain, numbness, paresis, paresthesias, pelvic pain, perianal numbness, tingling, weakness or weight loss. Treatments tried: BC powder. The treatment provided mild relief.     Constitution: Negative for fever.   Cardiovascular:  Negative for chest pain.   Gastrointestinal:  Negative for abdominal pain and bowel incontinence.   Genitourinary:  Negative for dysuria, bladder incontinence and pelvic pain.   Musculoskeletal:  Positive for back pain.   Neurological:  Negative for headaches and numbness.     Objective:      Physical Exam   Constitutional: She is oriented to person, place, and time. She appears well-developed. She is cooperative. No distress.   HENT:   Head: Normocephalic and atraumatic.   Nose: Nose normal.   Mouth/Throat: Oropharynx is clear and moist and mucous " membranes are normal.   Eyes: Conjunctivae and lids are normal.   Neck: Trachea normal and phonation normal. Neck supple.   Cardiovascular: Normal rate, regular rhythm, normal heart sounds and normal pulses.   No murmur heard.  Pulmonary/Chest: Effort normal and breath sounds normal. No stridor. No respiratory distress. She has no wheezes. She has no rhonchi. She has no rales.   Abdominal: Normal appearance and bowel sounds are normal. She exhibits no mass. Soft.   Musculoskeletal:         General: No swelling, tenderness, deformity or signs of injury.      Comments:   Back exam:    No TTP   No redness, swelling, bruise   ROM:   Lateral flexion mildly limited secondary to muscle spasm,   Forward flexion mildly limited secondary to muscle spasm.  No sensory/motor deficit  SLR: WNL      Neurological: She is alert and oriented to person, place, and time. She has normal strength and normal reflexes. No sensory deficit.   Skin: Skin is warm, dry, intact and not diaphoretic.   Psychiatric: Her speech is normal and behavior is normal. Judgment and thought content normal.   Nursing note and vitals reviewed.      Assessment:       1. Low back pain without sciatica, unspecified back pain laterality, unspecified chronicity    2. Back strain, initial encounter    3. Hypertension, unspecified type          Plan:         Low back pain without sciatica, unspecified back pain laterality, unspecified chronicity  -     POCT Urinalysis, Dipstick, Manual, w/o Scope  -     POCT urine pregnancy  -     Ambulatory referral/consult to Orthopedics    Back strain, initial encounter    Hypertension, unspecified type    Other orders  -     ketorolac injection 30 mg  -     naproxen (NAPROSYN) 500 MG tablet; Take 1 tablet (500 mg total) by mouth 2 (two) times daily with meals. for 10 days  Dispense: 20 tablet; Refill: 0  -     methocarbamoL (ROBAXIN) 500 MG Tab; Take 1 tablet (500 mg total) by mouth 3 (three) times daily. As needed for muscle  spasm. May cause drowsiness for 7 days  Dispense: 20 tablet; Refill: 0         You received an injection of a powerful NSAID today (Toradol).  Please do not take another NSAID (ie aspirin, ibuprofen, Aleve, Advil or Motrin) until tomorrow.  If you continue to have pain, you may take Tylenol (acetaminophen) if you are not allergic to this medication.  Take a medicines with food and not empty stomach as these medicines can irritate the stomach.  If you feel any heartburn or dyspepsia, use over-the-counter Pepcid or Prilosec to counter.

## 2022-08-31 ENCOUNTER — PATIENT MESSAGE (OUTPATIENT)
Dept: ORTHOPEDICS | Facility: CLINIC | Age: 37
End: 2022-08-31
Payer: COMMERCIAL

## 2022-09-01 ENCOUNTER — TELEPHONE (OUTPATIENT)
Dept: ORTHOPEDICS | Facility: CLINIC | Age: 37
End: 2022-09-01
Payer: COMMERCIAL

## 2022-09-01 DIAGNOSIS — M54.50 LOW BACK PAIN, UNSPECIFIED BACK PAIN LATERALITY, UNSPECIFIED CHRONICITY, UNSPECIFIED WHETHER SCIATICA PRESENT: Primary | ICD-10-CM

## 2022-09-01 NOTE — PROGRESS NOTES
DATE: 9/2/2022  PATIENT: Sade Mullins    Supervising Physician: Hector Cee M.D.    CHIEF COMPLAINT: low back pain    HISTORY:  Sade Mullins is a 37 y.o. female here for initial evaluation of low back pain (Back - 8). The pain has been present for about 1 week. Denies specific injury. The patient describes the pain as a constant sharp pain.  The pain is worse with any movement and improved by nothing. There is no associated numbness and tingling. There is no subjective weakness. Prior treatments have included aleve and robaxin, but no PT, CHERYL or surgery. The patient went to urgent care on 8/29/22 and was discharged stable.     The patient denies myelopathic symptoms such as handwriting changes or difficulty with buttons/coins/keys. Denies perineal paresthesias, bowel/bladder dysfunction.    PAST MEDICAL/SURGICAL HISTORY:  History reviewed. No pertinent past medical history.  History reviewed. No pertinent surgical history.    Medications:   Current Outpatient Medications on File Prior to Visit   Medication Sig Dispense Refill    naproxen (NAPROSYN) 500 MG tablet Take 1 tablet (500 mg total) by mouth 2 (two) times daily with meals. for 10 days 20 tablet 0    [DISCONTINUED] methocarbamoL (ROBAXIN) 500 MG Tab Take 1 tablet (500 mg total) by mouth 3 (three) times daily. As needed for muscle spasm. May cause drowsiness for 7 days 20 tablet 0    phentermine 15 MG capsule Take 8 mg by mouth 3 (three) times daily with meals.       No current facility-administered medications on file prior to visit.       Social History:   Social History     Socioeconomic History    Marital status: Single   Tobacco Use    Smoking status: Never    Smokeless tobacco: Never   Substance and Sexual Activity    Alcohol use: Yes    Drug use: No    Sexual activity: Yes       REVIEW OF SYSTEMS:  Constitution: Negative. Negative for chills, fever and night sweats.   Cardiovascular: Negative for chest pain and syncope.   Respiratory:  "Negative for cough and shortness of breath.   Gastrointestinal: See HPI. Negative for nausea/vomiting. Negative for abdominal pain.  Genitourinary: See HPI. Negative for discoloration or dysuria.  Skin: Negative for dry skin, itching and rash.   Hematologic/Lymphatic: Negative for bleeding problem. Does not bruise/bleed easily.   Musculoskeletal: Negative for falls and muscle weakness.   Neurological: See HPI. No seizures.   Endocrine: Negative for polydipsia, polyphagia and polyuria.   Allergic/Immunologic: Negative for hives and persistent infections.     EXAM:  Ht 5' 5" (1.651 m)   Wt 84 kg (185 lb 3 oz)   LMP 08/24/2022   BMI 30.82 kg/m²     General: The patient is a very pleasant 37 y.o. female in no apparent distress, the patient is oriented to person, place and time.  Psych: Normal mood and affect  HEENT: Vision grossly intact, hearing intact to the spoken word.  Lungs: Respirations unlabored.  Gait: antalgic station and gait, no difficulty with toe or heel walk.   Skin: Dorsal lumbar skin negative for rashes, lesions, hairy patches and surgical scars. There is mild lumbar tenderness to palpation.  Range of motion: Lumbar range of motion is acceptable.  Spinal Balance: Global saggital and coronal spinal balance acceptable, not significant for scoliosis and kyphosis.  Musculoskeletal: No pain with the range of motion of the bilateral hips. No trochanteric tenderness to palpation.  Vascular: Bilateral lower extremities warm and well perfused, dorsalis pedis pulses 2+ bilaterally.  Neurological: Normal strength and tone in all major motor groups in the bilateral lower extremities. Normal sensation to light touch in the L2-S1 dermatomes bilaterally.  Deep tendon reflexes symmetric 2+ in the bilateral lower extremities.  Negative Babinski bilaterally. Straight leg raise negative bilaterally.    IMAGING:      Today I personally reviewed AP, Lat and Flex/Ex  upright L-spine films that demonstrate mild DDD from " L3-S1.       Body mass index is 30.82 kg/m².    No results found for: HGBA1C        ASSESSMENT/PLAN:    Sade was seen today for low-back pain.    Diagnoses and all orders for this visit:    Acute bilateral low back pain without sciatica  -     methylPREDNISolone (MEDROL DOSEPACK) 4 mg tablet; use as directed  -     methocarbamoL (ROBAXIN) 500 MG Tab; Take 2 tablets (1,000 mg total) by mouth 3 (three) times daily as needed (muscle spasm).  -     Ambulatory referral/consult to Physical/Occupational Therapy; Future    Today we discussed at length all of the different treatment options including anti-inflammatories, acetaminophen, rest, ice, heat, physical therapy including strengthening and stretching exercises, home exercises, ROM, aerobic conditioning, aqua therapy, other modalities including ultrasound, massage, and dry needling, epidural steroid injections and finally surgical intervention.    Medrol dose pack and robaxin sent to pharmacy. PT orders placed. The patient may follow up as needed.

## 2022-09-02 ENCOUNTER — HOSPITAL ENCOUNTER (OUTPATIENT)
Dept: RADIOLOGY | Facility: HOSPITAL | Age: 37
Discharge: HOME | End: 2022-09-02
Attending: REGISTERED NURSE
Payer: COMMERCIAL

## 2022-09-02 ENCOUNTER — OFFICE VISIT (OUTPATIENT)
Dept: ORTHOPEDICS | Facility: CLINIC | Age: 37
End: 2022-09-02
Payer: COMMERCIAL

## 2022-09-02 VITALS — HEIGHT: 65 IN | WEIGHT: 185.19 LBS | BODY MASS INDEX: 30.85 KG/M2

## 2022-09-02 DIAGNOSIS — M54.50 LOW BACK PAIN, UNSPECIFIED BACK PAIN LATERALITY, UNSPECIFIED CHRONICITY, UNSPECIFIED WHETHER SCIATICA PRESENT: ICD-10-CM

## 2022-09-02 DIAGNOSIS — M54.50 ACUTE BILATERAL LOW BACK PAIN WITHOUT SCIATICA: Primary | ICD-10-CM

## 2022-09-02 PROCEDURE — 1159F PR MEDICATION LIST DOCUMENTED IN MEDICAL RECORD: ICD-10-PCS | Mod: CPTII,S$GLB,, | Performed by: REGISTERED NURSE

## 2022-09-02 PROCEDURE — 72110 XR LUMBAR SPINE AP AND LAT WITH FLEX/EXT: ICD-10-PCS | Mod: 26,,, | Performed by: RADIOLOGY

## 2022-09-02 PROCEDURE — 99213 PR OFFICE/OUTPT VISIT, EST, LEVL III, 20-29 MIN: ICD-10-PCS | Mod: S$GLB,,, | Performed by: REGISTERED NURSE

## 2022-09-02 PROCEDURE — 99213 OFFICE O/P EST LOW 20 MIN: CPT | Mod: S$GLB,,, | Performed by: REGISTERED NURSE

## 2022-09-02 PROCEDURE — 72110 X-RAY EXAM L-2 SPINE 4/>VWS: CPT | Mod: TC

## 2022-09-02 PROCEDURE — 99999 PR PBB SHADOW E&M-EST. PATIENT-LVL III: CPT | Mod: PBBFAC,,, | Performed by: REGISTERED NURSE

## 2022-09-02 PROCEDURE — 3008F BODY MASS INDEX DOCD: CPT | Mod: CPTII,S$GLB,, | Performed by: REGISTERED NURSE

## 2022-09-02 PROCEDURE — 1159F MED LIST DOCD IN RCRD: CPT | Mod: CPTII,S$GLB,, | Performed by: REGISTERED NURSE

## 2022-09-02 PROCEDURE — 99999 PR PBB SHADOW E&M-EST. PATIENT-LVL III: ICD-10-PCS | Mod: PBBFAC,,, | Performed by: REGISTERED NURSE

## 2022-09-02 PROCEDURE — 3008F PR BODY MASS INDEX (BMI) DOCUMENTED: ICD-10-PCS | Mod: CPTII,S$GLB,, | Performed by: REGISTERED NURSE

## 2022-09-02 PROCEDURE — 72110 X-RAY EXAM L-2 SPINE 4/>VWS: CPT | Mod: 26,,, | Performed by: RADIOLOGY

## 2022-09-02 RX ORDER — METHOCARBAMOL 500 MG/1
1000 TABLET, FILM COATED ORAL 3 TIMES DAILY PRN
Qty: 80 TABLET | Refills: 2 | Status: SHIPPED | OUTPATIENT
Start: 2022-09-02 | End: 2022-10-12

## 2022-09-02 RX ORDER — METHYLPREDNISOLONE 4 MG/1
TABLET ORAL
Qty: 1 EACH | Refills: 0 | Status: SHIPPED | OUTPATIENT
Start: 2022-09-02 | End: 2022-09-23

## 2022-10-04 ENCOUNTER — HOSPITAL ENCOUNTER (EMERGENCY)
Facility: OTHER | Age: 37
Discharge: HOME | End: 2022-10-04
Attending: EMERGENCY MEDICINE
Payer: COMMERCIAL

## 2022-10-04 VITALS
HEIGHT: 65 IN | BODY MASS INDEX: 30.49 KG/M2 | HEART RATE: 79 BPM | TEMPERATURE: 98 F | DIASTOLIC BLOOD PRESSURE: 84 MMHG | OXYGEN SATURATION: 100 % | SYSTOLIC BLOOD PRESSURE: 141 MMHG | WEIGHT: 183 LBS | RESPIRATION RATE: 16 BRPM

## 2022-10-04 DIAGNOSIS — O20.9 VAGINAL BLEEDING AFFECTING EARLY PREGNANCY: Primary | ICD-10-CM

## 2022-10-04 LAB
ABO + RH BLD: NORMAL
B-HCG UR QL: POSITIVE
BACTERIA GENITAL QL WET PREP: ABNORMAL
BASOPHILS # BLD AUTO: 0.05 K/UL (ref 0–0.2)
BASOPHILS NFR BLD: 0.8 % (ref 0–1.9)
BILIRUB UR QL STRIP: ABNORMAL
BLD GP AB SCN CELLS X3 SERPL QL: NORMAL
CLARITY UR: CLEAR
CLUE CELLS VAG QL WET PREP: ABNORMAL
COLOR UR: YELLOW
CTP QC/QA: YES
DIFFERENTIAL METHOD: ABNORMAL
EOSINOPHIL # BLD AUTO: 0.1 K/UL (ref 0–0.5)
EOSINOPHIL NFR BLD: 0.8 % (ref 0–8)
ERYTHROCYTE [DISTWIDTH] IN BLOOD BY AUTOMATED COUNT: 13.2 % (ref 11.5–14.5)
FILAMENT FUNGI VAG WET PREP-#/AREA: ABNORMAL
GLUCOSE UR QL STRIP: NEGATIVE
HCG INTACT+B SERPL-ACNC: NORMAL MIU/ML
HCT VFR BLD AUTO: 40.8 % (ref 37–48.5)
HGB BLD-MCNC: 13.9 G/DL (ref 12–16)
HGB UR QL STRIP: ABNORMAL
IMM GRANULOCYTES # BLD AUTO: 0.05 K/UL (ref 0–0.04)
IMM GRANULOCYTES NFR BLD AUTO: 0.8 % (ref 0–0.5)
KETONES UR QL STRIP: ABNORMAL
LEUKOCYTE ESTERASE UR QL STRIP: NEGATIVE
LYMPHOCYTES # BLD AUTO: 1.6 K/UL (ref 1–4.8)
LYMPHOCYTES NFR BLD: 26.1 % (ref 18–48)
MCH RBC QN AUTO: 30.5 PG (ref 27–31)
MCHC RBC AUTO-ENTMCNC: 34.1 G/DL (ref 32–36)
MCV RBC AUTO: 90 FL (ref 82–98)
MICROSCOPIC COMMENT: ABNORMAL
MONOCYTES # BLD AUTO: 0.5 K/UL (ref 0.3–1)
MONOCYTES NFR BLD: 8.1 % (ref 4–15)
NEUTROPHILS # BLD AUTO: 3.9 K/UL (ref 1.8–7.7)
NEUTROPHILS NFR BLD: 63.4 % (ref 38–73)
NITRITE UR QL STRIP: NEGATIVE
NRBC BLD-RTO: 0 /100 WBC
PH UR STRIP: 6 [PH] (ref 5–8)
PLATELET # BLD AUTO: 250 K/UL (ref 150–450)
PMV BLD AUTO: 10.2 FL (ref 9.2–12.9)
PROT UR QL STRIP: ABNORMAL
RBC # BLD AUTO: 4.55 M/UL (ref 4–5.4)
RBC #/AREA URNS HPF: 50 /HPF (ref 0–4)
SP GR UR STRIP: >=1.03 (ref 1–1.03)
SPECIMEN SOURCE: ABNORMAL
T VAGINALIS GENITAL QL WET PREP: ABNORMAL
URN SPEC COLLECT METH UR: ABNORMAL
UROBILINOGEN UR STRIP-ACNC: 1 EU/DL
WBC # BLD AUTO: 6.2 K/UL (ref 3.9–12.7)
WBC #/AREA VAG WET PREP: ABNORMAL
YEAST GENITAL QL WET PREP: ABNORMAL

## 2022-10-04 PROCEDURE — 87210 SMEAR WET MOUNT SALINE/INK: CPT | Performed by: EMERGENCY MEDICINE

## 2022-10-04 PROCEDURE — 87591 N.GONORRHOEAE DNA AMP PROB: CPT | Performed by: EMERGENCY MEDICINE

## 2022-10-04 PROCEDURE — 25000003 PHARM REV CODE 250: Performed by: EMERGENCY MEDICINE

## 2022-10-04 PROCEDURE — 81025 URINE PREGNANCY TEST: CPT | Performed by: EMERGENCY MEDICINE

## 2022-10-04 PROCEDURE — 84702 CHORIONIC GONADOTROPIN TEST: CPT | Performed by: EMERGENCY MEDICINE

## 2022-10-04 PROCEDURE — 86850 RBC ANTIBODY SCREEN: CPT | Performed by: EMERGENCY MEDICINE

## 2022-10-04 PROCEDURE — 81000 URINALYSIS NONAUTO W/SCOPE: CPT | Performed by: EMERGENCY MEDICINE

## 2022-10-04 PROCEDURE — 87491 CHLMYD TRACH DNA AMP PROBE: CPT | Performed by: EMERGENCY MEDICINE

## 2022-10-04 PROCEDURE — 85025 COMPLETE CBC W/AUTO DIFF WBC: CPT | Performed by: EMERGENCY MEDICINE

## 2022-10-04 PROCEDURE — 99284 EMERGENCY DEPT VISIT MOD MDM: CPT | Mod: 25

## 2022-10-04 RX ORDER — METRONIDAZOLE 500 MG/1
500 TABLET ORAL EVERY 12 HOURS
Qty: 14 TABLET | Refills: 0 | Status: SHIPPED | OUTPATIENT
Start: 2022-10-04 | End: 2022-10-11

## 2022-10-04 RX ORDER — ACETAMINOPHEN 500 MG
1000 TABLET ORAL
Status: COMPLETED | OUTPATIENT
Start: 2022-10-04 | End: 2022-10-04

## 2022-10-04 RX ADMIN — ACETAMINOPHEN 1000 MG: 500 TABLET, FILM COATED ORAL at 10:10

## 2022-10-04 NOTE — DISCHARGE INSTRUCTIONS
Follow up with your OB.    We have prescribed you antibiotics. Please fill and take as directed. It is important that you completely finish the antibiotics.      Your HCG level today is 569[address].    Please return to the ER if you have worsening bleeding, chest pain, difficulty breathing, fevers, altered mental status, dizziness, weakness, or any other concerns.

## 2022-10-04 NOTE — Clinical Note
"Sade"Sade" Susanna was seen and treated in our emergency department on 10/4/2022.  She may return to work on 10/06/2022.       If you have any questions or concerns, please don't hesitate to call.      Katherine Del Valle MD"

## 2022-10-04 NOTE — ED TRIAGE NOTES
Chief Complaint   Patient presents with    Vaginal Bleeding     Brown vaginal spotting this morning with lower back pain. 6 weeks pregnant.      Pt presents to ED with brown vaginal spotting w/ back pain x this am. Pt approx 6 weeks OB. AAOX4, tearful and anxious. Speaking in clear sentences.

## 2022-10-04 NOTE — ED PROVIDER NOTES
Encounter Date: 10/4/2022       History     Chief Complaint   Patient presents with    Vaginal Bleeding     Brown vaginal spotting this morning with lower back pain. 6 weeks pregnant.      Seen by physician at 9:24AM:    Patient is a  37-year-old female who presents to the emergency department with vaginal bleeding.  Patient states that her last menstrual period was .  She started having some spotting today along with some mild pelvic cramping.  Patient had been having some back pain that she attributes to musculoskeletal issues, but the back pain did slightly worsen today.  She denies any urinary symptoms.  Denies any fevers/chills.  Denies any chest pain/shortness of breath.  Denies any nausea vomiting/diarrhea.  She has not had any confirmatory ultrasound this pregnancy    Review of patient's allergies indicates:  No Known Allergies  History reviewed. No pertinent past medical history.  History reviewed. No pertinent surgical history.  Family History   Problem Relation Age of Onset    Hypertension Mother     Hyperlipidemia Mother     Hyperlipidemia Father     Heart disease Father      Social History     Tobacco Use    Smoking status: Never    Smokeless tobacco: Never   Substance Use Topics    Alcohol use: Yes    Drug use: No     Review of Systems   Constitutional:  Negative for chills and fever.   HENT:  Negative for congestion and rhinorrhea.    Respiratory:  Negative for chest tightness and shortness of breath.    Cardiovascular:  Negative for chest pain and palpitations.   Gastrointestinal:  Negative for abdominal pain, diarrhea, nausea and vomiting.   Genitourinary:  Positive for pelvic pain and vaginal bleeding. Negative for dysuria and flank pain.   Musculoskeletal:  Positive for back pain. Negative for neck pain.   Skin:  Negative for color change and wound.   Neurological:  Negative for dizziness and headaches.     Physical Exam     Initial Vitals [10/04/22 0906]   BP Pulse Resp Temp  SpO2   136/74 75 16 98 °F (36.7 °C) 100 %      MAP       --         Physical Exam    Nursing note and vitals reviewed.  Constitutional: She appears well-developed and well-nourished.   HENT:   Head: Normocephalic and atraumatic.   Eyes: Conjunctivae are normal.   Neck: Neck supple.   Normal range of motion.  Cardiovascular:  Normal rate, regular rhythm and normal heart sounds.           Pulmonary/Chest: Breath sounds normal. No respiratory distress. She has no wheezes. She has no rales.   Abdominal: Abdomen is soft. Bowel sounds are normal. She exhibits no distension. There is no abdominal tenderness. There is no rebound.   Musculoskeletal:         General: Normal range of motion.      Cervical back: Normal range of motion and neck supple.     Neurological: She is alert and oriented to person, place, and time.   Skin: Skin is warm and dry. Capillary refill takes less than 2 seconds.       ED Course   Procedures  Labs Reviewed   VAGINAL SCREEN - Abnormal; Notable for the following components:       Result Value    Clue Cells Occasional (*)     WBC - Vaginal Screen Occasional (*)     Bacteria - Vaginal Screen Few (*)     All other components within normal limits    Narrative:     Release to patient->Immediate   URINALYSIS - Abnormal; Notable for the following components:    Specific Gravity, UA >=1.030 (*)     Protein, UA Trace (*)     Ketones, UA 2+ (*)     Bilirubin (UA) 1+ (*)     Occult Blood UA 2+ (*)     All other components within normal limits   CBC W/ AUTO DIFFERENTIAL - Abnormal; Notable for the following components:    Immature Granulocytes 0.8 (*)     Immature Grans (Abs) 0.05 (*)     All other components within normal limits    Narrative:     Release to patient->Immediate   URINALYSIS MICROSCOPIC - Abnormal; Notable for the following components:    RBC, UA 50 (*)     All other components within normal limits   POCT URINE PREGNANCY - Abnormal; Notable for the following components:    POC Preg Test, Ur  Positive (*)     All other components within normal limits   C. TRACHOMATIS/N. GONORRHOEAE BY AMP DNA   HCG, QUANTITATIVE    Narrative:     Release to patient->Immediate   TYPE & SCREEN          Imaging Results              US OB <14 Wks TransAbd & TransVag, Single Gestation (XPD) (Final result)  Result time 10/04/22 12:23:08      Final result by Nahid Cuba MD (10/04/22 12:23:08)                   Impression:      Single intrauterine gestation with an estimated age of 6 weeks and 0 days.  No embryonic cardiac activity identified at this time which may be secondary to very early gestation.  Follow-up with serial beta HCG and/or pelvic ultrasound recommended to diagnosis confirmation.      Electronically signed by: Nahid Cuba MD  Date:    10/04/2022  Time:    12:23               Narrative:    EXAMINATION:  US OB <14 WEEKS, TRANSABDOM & TRANSVAG, SINGLE GESTATION (XPD)    CLINICAL HISTORY:  vaginal bleeding and cramping;    TECHNIQUE:  Transabdominal sonography of the pelvis was performed, followed by transvaginal sonography to better evaluate the uterus and ovaries.    COMPARISON:  CT chest, abdomen and pelvis 10/31/2019    FINDINGS:  Uterus: 8.3 x 4.4 x 5.2 cm.  No discrete fibroid.    Intrauterine gestation(s): Single    Mean gestational sac diameter: 1.3 cm    Yolk sac: Present    Crown-rump length (CRL): 0.3 cm    Cardiac activity: None detected.    Subchorionic hemorrhage: Suspected small.    Right ovary: Within normal limits.    Left ovary: Within normal limits.    Miscellaneous: No Free Fluid.                                       Medications   acetaminophen tablet 1,000 mg (1,000 mg Oral Given 10/4/22 1034)     Medical Decision Making:   History:   Old Medical Records: I decided to obtain old medical records.  Old Records Summarized: other records and records from clinic visits.  Initial Assessment:   9:24AM:  Patient is a 37-year-old female who presents to the emergency department with vaginal bleeding  and pelvic cramping.  Patient appears well, nontoxic.  She will need a confirmatory ultrasound.  Will plan for labs, imaging, pelvic swabs, with continue to follow reassess   Clinical Tests:   Lab Tests: Ordered and Reviewed  Radiological Study: Ordered and Reviewed     12:46 PM:  Patient doing well, remains stable.  She does have clue cells on vaginal screen, which may be contributing to her irregular bleeding.  Will plan to provide a course of antibiotics.  Patient's ultrasound does show fetal pole but no evidence of a heartbeat which is not unexpected given her gestational age.  Will have her follow-up with her Ob.  I did discuss pelvic precautions with her.  I do not feel that further work up in the ED is indicated at this time.  I updated pt regarding results and I counseled pt regarding supportive care measures.  I have discussed with the pt ED return warnings and need for close PCP f/u.  Pt agreeable to plan and all questions answered.  I feel that pt is stable for discharge and management as an outpatient and no further intervention is needed at this time.  Pt is comfortable returning to the ED if needed.  Will DC home in stable condition.                         Clinical Impression:   Final diagnoses:  [O20.9] Vaginal bleeding affecting early pregnancy (Primary)      ED Disposition Condition    Discharge Stable          ED Prescriptions    None       Follow-up Information       Follow up With Specialties Details Why Contact Info    Gabby Ordonez MD Obstetrics   [address] [place] [address]  [phone]               Katherine Del Valle MD  10/04/22 5295

## 2022-10-06 LAB
C TRACH DNA SPEC QL NAA+PROBE: NOT DETECTED
N GONORRHOEA DNA SPEC QL NAA+PROBE: NOT DETECTED

## 2022-10-11 ENCOUNTER — CLINICAL SUPPORT (OUTPATIENT)
Dept: REHABILITATION | Facility: OTHER | Age: 37
End: 2022-10-11
Payer: COMMERCIAL

## 2022-10-11 DIAGNOSIS — M54.50 ACUTE BILATERAL LOW BACK PAIN WITHOUT SCIATICA: ICD-10-CM

## 2022-10-11 DIAGNOSIS — R29.898 WEAKNESS OF BOTH HIPS: ICD-10-CM

## 2022-10-11 PROCEDURE — 97110 THERAPEUTIC EXERCISES: CPT | Mod: PN

## 2022-10-11 PROCEDURE — 97162 PT EVAL MOD COMPLEX 30 MIN: CPT | Mod: PN

## 2022-10-11 NOTE — PROGRESS NOTES
"[facility]  Physical Therapy Initial Evaluation    Name: Sade Mullins  Clinic Number: [id number]    Therapy Diagnosis: No diagnosis found.  Physician: SHALA Staples NP    Physician Orders: Eval and Treat ***  Medical Diagnosis from Referral: ***  Evaluation Date: 10/11/2022  Authorization Period Expiration: ***  Plan of Care Expiration: ***  Visit # / Visits authorized: ***/ ***    Time In: ***  Time Out: ***  Total Billable Time: *** minutes    Precautions: {IP WOUND PRECAUTIONS OHS:61835}    Subjective   Date of onset: ***  History of current condition - Sade reports: ***     Medical History:   No past medical history on file.    Surgical History:   Sade Mullins  has no past surgical history on file.    Medications:   Sade has a current medication list which includes the following prescription(s): methocarbamol and metronidazole.    Allergies:   Review of patient's allergies indicates:  No Known Allergies     Imaging, {Mri/ctscan/bone scan:58575}: ***    Prior Therapy: ***  Social History: *** {LIVES WITH:99236}  Occupation: ***  Prior Level of Function: Pt independent with all ADLs, job responsibilities and participating in regular physical activity  Current Level of Function: Pt has difficulty with all ADLs, job responsibilities and participating in regular physical activity     Pain:  Current {0-10:68392::"0"}/10, worst {0-10:54961::"0"}/10, best {0-10:98904::"0"}/10   Location: {RIGHT LEFT BILATERAL:52542} {LOCATION ON BODY:46972}  Description: {Pain Description:39385}  Aggravating Factors: {Causes; Pain:17795}  Easing Factors: {Pain (activities that relieve):84373}    Pts goals: ***    Objective     Observation:   ***  Posture:     Palpation:  ***    Flexibility:   ***    Range of Motion:   ***    Thoracolumbar AROM Pain/Dysfunction with Movement   Flexion ***    Extension ***    Right side bending ***    Left side bending ***      Hip- General:  Knee - " General:   Ankle - General:     MMT/Strength:  ***    Right Left Pain/Dysfunction with Movement   Hip Flexion {AMB PT VESTIBULAR STRENGTH:84414} {AMB PT VESTIBULAR STRENGTH:02445}    Hip Extension {AMB PT VESTIBULAR STRENGTH:62383} {AMB PT VESTIBULAR STRENGTH:58843}    Hip IR {AMB PT VESTIBULAR STRENGTH:29632} {AMB PT VESTIBULAR STRENGTH:01516}    Hip ER {AMB PT VESTIBULAR STRENGTH:24517} {AMB PT VESTIBULAR STRENGTH:92331}    Hip Adduction {AMB PT VESTIBULAR STRENGTH:85572} {AMB PT VESTIBULAR STRENGTH:49046}    Hip Abduction {AMB PT VESTIBULAR STRENGTH:37410} {AMB PT VESTIBULAR STRENGTH:95371}    Knee Flexion {AMB PT VESTIBULAR STRENGTH:54469} {AMB PT VESTIBULAR STRENGTH:99246}    Knee Extension {AMB PT VESTIBULAR STRENGTH:41937} {AMB PT VESTIBULAR STRENGTH:55038}    Ankle DF {AMB PT VESTIBULAR STRENGTH:51741} {AMB PT VESTIBULAR STRENGTH:91715}    Ankle PF {AMB PT VESTIBULAR STRENGTH:45480} {AMB PT VESTIBULAR STRENGTH:80094}          Joint Mobility:   - Thoracic: ***  - Lumbar: ***          Special Tests:  ***  Double Knee to Chest:  Prone on Elbows      REPEATED TEST MOVEMENTS:  Repeated Flexion in Standing {Repeated Test Movements:21970}   Repeated Extension in Standing {Repeated Test Movements:07624}   Repeated Flexion in lying {Repeated Test Movements:82975}   Repeated Extension in lying  {Repeated Test Movements:64258}       STATIC TESTS   Sitting slouched  {Repeated Test Movements:79184}   Sitting erect {Repeated Test Movements:63883}   Standing slouched {Repeated Test Movements:34887}   Standing erect  {Repeated Test Movements:52780}   Lying prone in extension  {Repeated Test Movements:35946}   Long sitting   {Repeated Test Movements:83437}       Girth:  ***    Other:   ***  Gait Analysis:{AD:97523} {PT GAIT ANALYSIS:14311} Deviations: ***  NBOS EO:  NBOS EC:  Tandem:   - Right in front:   - Left in front:  SLS:   Right:   Left:    5x sit<>stand: *** seconds    Normal:   60-69: 11.4 seconds  70-79: 12.6  "seconds  80-89: 12.7 seconds    30 seconds sit<>stand: *** reps    Normal:   Age Male Female   60-64 17 15   65-69 16 15   70-74 15 14   75-79 14 13   80-84 13 12   85-89 11 11   90-94 9 9        TUG: *** seconds (3x average)    Normal:  Community dwelling adult: >13.5 seconds  Older stroke patients: >14 seconds  Older adults already attending falls clinic: >15 seconds  Frail Elderly: >32.6 seconds  LE amputees >19 seconds  Parkinson's disease: >11.5 seconds  Hip OA: >10 seconds  Vestibular disorders: >11.1 seconds    (Reference: https://www.sralab.org/rehabilitation-measures/timed-and-go#older-adults-and-geriatric-care)       Sensation: ***        CMS Impairment/Limitation/Restriction for FOTO *** Survey    Therapist reviewed FOTO scores for Sade Mullins on 10/11/2022.   FOTO documents entered into EPIC - see Media section.    Limitation Score: ***%  Category: {Blank single:52992::"Other","Self Care","Body Position","Carrying","Mobility"}    Current : {G Codes:68256}  Goal: {G Codes:77887}           TREATMENT   Treatment Time In: ***  Treatment Time Out: ***  Total Treatment time separate from Evaluation: *** minutes    Sade received therapeutic exercises to develop {AMB PT PROGRESS OBJECTIVE:13548} for *** minutes including:  ***    Sade received the following manual therapy techniques: {AMB PT PROGRESS MANUAL THERAPY:85557} were applied to the: *** for *** minutes, including:  ***    Sade participated in neuromuscular re-education activities to improve: {AMB PT PROGRESS NEURO RE-ED:13419} for *** minutes. The following activities were included:  ***    Sade participated in dynamic functional therapeutic activities to improve functional performance for ***  minutes, including:  ***    Sade participated in gait training to improve functional mobility and safety for ***  minutes, including:  ***    Sade received the following direct contact modalities after being cleared for " contraindications: {AMB PT PROGRESS DIRECT CONTACT MODES:01371}    Sade received the following supervised modalities after being cleared for contradictions: {AMB PT SUPERVISED MODES:68745}    Sade received hot pack for *** minutes to ***.    Sade received cold pack for *** minutes to ***.    Home Exercises and Patient Education Provided    Education provided:   Patient was educated on initial evaluation findings and expectations as well as future PT services, procedures, and expectations for optimal compliance with therapy.     Written Home Exercises Provided: HEP will be provided at first treatment visit.     Assessment   Sade is a 37 y.o. female referred to outpatient Physical Therapy with a medical diagnosis of ***. Pt presents with decreased ROM, strength, flexibility, *** special tests, TTP with *** palpation, poor posture and *** causing increased pain and decreased participation with work, recreational and household duties.      Pt prognosis is {REHAB PROGNOSIS OHS:49284}.   Pt will benefit from skilled outpatient Physical Therapy to address the deficits stated above and in the chart below, provide pt/family education, and to maximize pt's level of independence to return to OF.     Plan of care discussed with patient: {YES:88204}  Pt's spiritual, cultural and educational needs considered and patient is agreeable to the plan of care and goals as stated below:     Anticipated Barriers for therapy: ***    Medical Necessity is demonstrated by the following  History  Co-morbidities and personal factors that may impact the plan of care Co-morbidities:   {Co-morbidities:76533}    Personal Factors:   {Personal Factors:26454}     {Desc; low/moderate/high:706893}   Examination  Body Structures and Functions, activity limitations and participation restrictions that may impact the plan of care Body Regions:   {Body Regions:83573}    Body Systems:    {Body Systems:40406}    Participation Restrictions:    ***    Activity limitations:   Learning and applying knowledge  {Learning and applying knowledge:19935}    General Tasks and Commands  {Gen tasks and commands:72118}    Communication  {Communication:17096}    Mobility  {Mobility:32564}    Self care  {Self Care:77980}    Domestic Life  {Domestic Life:50847}    Interactions/Relationships  {Interactions/Relationships:50461}    Life Areas  {Life Areas:73226}    Community and Social Life  {Community/Social Life:48177}         {Desc; low/moderate/high:797972}   Clinical Presentation {Clinical Presentation :16070} {Desc; low/moderate/high:326846}   Decision Making/ Complexity Score: {Desc; low/moderate/high:801466}     Goals:    In *** weeks,   Goal Status   Patient will be independent with HEP to promote improved therapy outcomes.  STG    2. Patient will perform palloff press with good control to demonstrate improved core strength STG    3. Patient will improve *** MMT to ***/5 to demonstrate improved strength for functional tasks.  STG    4. Patient will improve lumbar *** ROM by *** degrees to improve functional mobility.  STG        In *** weeks,  Goal Status   5. Patient will be independent with progressed HEP to self manage symptoms.  LTG    6. Patient will improve *** MMT to ***/5 to improve strength for functional tasks.  LTG    7. Patient will report walking for 30 minutes with <2/10 pain 5x/week to promote healthy lifestyle and regular physical exercise.  LTG    8. Patient will *** LTG        1. Pt will improve FOTO score to </= ***% limited to decrease perceived limitation with maintaining/changing body position.   2.Patient will improve *** SLS to 30 seconds to decrease fall risk and improve ambulation.  3. Patient will improve *** sit<>stand to *** to improve functional strength and promote mobility.        Plan   Plan of care Certification: 10/11/2022 to ***.    Outpatient Physical Therapy {NUMBERS 1-5:78590} times weekly for *** visits to include the  following interventions: {TX PLAN:53742}.     Sandra Garza, PT

## 2022-10-11 NOTE — PLAN OF CARE
"[facility]  Physical Therapy Initial Evaluation    Name: Sade Mullins  [facility] Number: [id number]    Therapy Diagnosis:   Encounter Diagnoses   Name Primary?    Acute bilateral low back pain without sciatica     Weakness of both hips      Physician: SHALA Staples NP    Physician Orders: PT Eval and Treat dry needling  Medical Diagnosis from Referral: Acute bilateral low back pain without sciatica (M54.50)  Evaluation Date: 10/11/2022  Authorization Period Expiration: 12/31/2022  Plan of Care Expiration: 01/06/2023  Visit # / Visits authorized: 1/ 1    Time In: 4:50 pm  Time Out: 5:35 pm  Total Billable Time: 10 minutes    Precautions: Standard, pregnant - approx 5 weeks pregnant at time of evaluation    Subjective   Date of onset: exacerbation in June and again in Mid August  History of current condition - Sade reports: low back pain in high school that she received PT for and it resolved for a while.  Notes that pain has recently returned but no specific TALIB.  Overall pain has gotten better since initial onset but she notes discomfort moving from sitting to standing.  Pain is tight and sore but also weak at the same time.  She also reports pregnancy and states that everything seems okay and there is a heart beat but still needs to wait and see. She is also in the process of switching to [facility].  This is her first pregnancy. Steroid pack helped after visit with MD on 9/2/2022.  Delfina is more manageable and uncomfortable rather than "pain".     Burning, tingling, numbness: none  Falls in the last 6 months: none    Patient denies dizziness, headaches, blurry vision, nausea, vomiting, impaired sensations in groin and saddle region and changes in bowel/bladder.  Increase in approx 20 lbs from Easter to June of unknown origin, she told her stomach doctor and requested blood work but it has not been done at this time.         Medical History:   No past medical history on " "file.  Breast reduction  Lap band that slipped and converted to gastric sleeve (2011, 2015)    Surgical History:   Sade Mullins  has no past surgical history on file. Lap band that was converted to gastric sleeve    Medications:   Sade currently has no medications in their medication list.    Allergies:   Review of patient's allergies indicates:  No Known Allergies     Imaging, x-ray: FINDINGS:  Minimal scoliosis convex to the right in the lumbar region is observed, which may be only positional in nature.  No significant alignment abnormality, and there is no significant translational instability identified at any lumbar or visualized lower thoracic level on the dynamic weight-bearing flexion/extension radiographs.  Vertebral body heights are normally maintained, without compression deformity at any level.  No significant disc narrowing.  Vertebral endplates are well defined.  No osseous destructive process.  SI joints appear unremarkable.     Impression:     No significant abnormality.    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 will have to to go to 3rd floor occasionally, there is an elevator  Prior Level of Function: Pt independent with all ADLs, job responsibilities and participating in regular physical activity  Current Level of Function: difficulty carrying objects/picking up kids, sit<>stand, bending over    Pain:  Current 4/10, worst 6/10, best 4/10   Location: bilateral low back   Description: Dull and Sharp  Aggravating Factors: Sitting for long periods of time, Bending, Lifting, and floor to stand transfer, transition from sit to stand  Easing Factors: pain medication    Pts goals: reduce pain, improve mobility, "feeling like my back is stronger"    Objective "     Observation:   Patient is alert and oriented, good historian.     Palpation:  Tender over glutes more on L than R    Flexibility: hamstring and piriformis tightness B    Range of Motion:     Thoracolumbar AROM Pain/Dysfunction with Movement   Flexion 25% limited HS tightness and low back discomfort   Extension 25% limited Incresed discomfort   Right side bending To knee joint Pulling on L side    Left side bending To knee joint      Rotation: 50% limited B with pulling on L side when turning towards R      MMT/Strength:      Right Left Pain/Dysfunction with Movement   Hip Flexion 4-/5 4-/5    Hip Extension 3-/5 3-/5    Hip IR 3+/5 3+/5    Hip ER 4-/5 4-/5    Hip Adduction 4-/5 4-/5    Hip Abduction 3+/5 3+/5        Joint Mobility:   - Lumbar: hypomobile    Special Tests:  SLR: (-)  Bridge test (+) with instability noted during DL bridge  Double Knee to Chest: symptom reduction  Prone on Elbows: pinching in low back    SIJ Tests:    Thigh Thrust: R: (-); L: (-)  Gaenslens Test: held today per pt request  SI Compression: R: (-); L: (-)  SI distraction: (-)  Sacral Thrust: (-)    Other:   Gait Analysis:Without AD Deviations: hip drop     5x sit<>stand: 13 seconds R valgus more than L    Normal:   60-69: 11.4 seconds  70-79: 12.6 seconds  80-89: 12.7 seconds    30 seconds sit<>stand: 11 reps    Normal:   Age Male Female   60-64 17 15   65-69 16 15   70-74 15 14   75-79 14 13   80-84 13 12   85-89 11 11   90-94 9 9          CMS Impairment/Limitation/Restriction for FOTO Lumbar Spine Survey    Therapist reviewed FOTO scores for Sade Mullins on 10/11/2022.   FOTO documents entered into [employer] - see Media section.    Limitation Score: 43%  Category: Other    Current : CK = at least 40% but < 60% impaired, limited or restricted  Goal: CJ = at least 20% but < 40% impaired, limited or restricted           TREATMENT   Treatment Time In: 5:20 pm  Treatment Time Out: 5:30 pm  Total Treatment time separate from Evaluation:  10 minutes    Sade received therapeutic exercises to develop strength and core stabilization for 10 minutes including:  HEP review, performance and education  Bridge with glute set  Hamstring stretch  DKTC  PPT  TrA bracing      Home Exercises and Patient Education Provided    Education provided:   Patient was educated on initial evaluation findings and expectations as well as future PT services, procedures, and expectations for optimal compliance with therapy.   pelvic floor PT as pregnancy progresses  Dry needling and contraindication during certain weeks of pregnancy     Written Home Exercises Provided: Yes, see patient instructions.      Assessment   Sade is a 37 y.o. female referred to outpatient Physical Therapy with a medical diagnosis of Acute bilateral low back pain without sciatica. Pt presents with decreased ROM, strength, flexibility, positive special tests, TTP with moderate palpation, poor posture  causing increased pain and decreased participation with work, recreational and household duties.      Pt prognosis is Good.   Pt will benefit from skilled outpatient Physical Therapy to address the deficits stated above and in the chart below, provide pt/family education, and to maximize pt's level of independence to return to PLOF.     Plan of care discussed with patient: Yes  Pt's spiritual, cultural and educational needs considered and patient is agreeable to the plan of care and goals as stated below:     Anticipated Barriers for therapy: early pregnancy     Medical Necessity is demonstrated by the following  History  Co-morbidities and personal factors that may impact the plan of care Co-morbidities:   Pregnancy, knowledge of current condition    Personal Factors:   age     moderate   Examination  Body Structures and Functions, activity limitations and participation restrictions that may impact the plan of care Body Regions:   back  lower extremities  trunk    Body Systems:     ROM  strength  gait    Participation Restrictions:   difficulty carrying objects/picking up kids, sit<>stand, bending over    Activity limitations:   Learning and applying knowledge  no deficits    General Tasks and Commands  no deficits    Communication  no deficits    Mobility  lifting and carrying objects    Self care  no deficits    Domestic Life  shopping  cooking  doing house work (cleaning house, washing dishes, laundry)    Interactions/Relationships  no deficits    Life Areas  employment    Community and Social Life  recreation and leisure         moderate   Clinical Presentation evolving clinical presentation with changing clinical characteristics moderate   Decision Making/ Complexity Score: moderate     Goals:    In 6 weeks,   Goal Status   Patient will be independent with HEP to promote improved therapy outcomes.  STG    2. Patient will perform palloff press with good control to demonstrate improved core strength STG    3. Patient will improve B hip MMT to 4-/5 to demonstrate improved strength for functional tasks.  STG    4. Patient will improve lumbar flexion/rotation ROM by 25% to improve functional mobility.  STG        In 12 weeks,  Goal Status   5. Patient will be independent with progressed HEP to self manage symptoms.  LTG    6. Patient will improve B hip MMT to 4/5 to improve strength for functional tasks.  LTG    7. Patient will report walking for 30 minutes with <2/10 pain 5x/week to promote healthy lifestyle and regular physical exercise.  LTG    8. Patient will improve FOTO score to </= 26% limited to decrease perceived limitation with maintaining/changing body position.  LTG    9. Patient will report max pain of 2/10 to improve activity tolerance and picking up kids as needed at work LTG        Plan   Plan of care Certification: 10/11/2022 to 01/06/2023.    Outpatient Physical Therapy 2 times weekly for 24 visits to include the following interventions: Gait Training, Manual Therapy, Moist  Heat/ Ice, Neuromuscular Re-ed, Patient Education, Therapeutic Activities, and Therapeutic Exercise, dry pérez Garza, PT

## 2022-10-12 PROBLEM — M54.50 ACUTE BILATERAL LOW BACK PAIN WITHOUT SCIATICA: Status: ACTIVE | Noted: 2022-10-12

## 2022-10-13 PROBLEM — R29.898 WEAKNESS OF BOTH HIPS: Status: ACTIVE | Noted: 2022-10-13

## 2022-10-25 ENCOUNTER — TELEPHONE (OUTPATIENT)
Dept: OBSTETRICS AND GYNECOLOGY | Facility: CLINIC | Age: 37
End: 2022-10-25
Payer: COMMERCIAL

## 2022-10-25 ENCOUNTER — CLINICAL SUPPORT (OUTPATIENT)
Dept: REHABILITATION | Facility: OTHER | Age: 37
End: 2022-10-25
Payer: COMMERCIAL

## 2022-10-25 ENCOUNTER — INITIAL PRENATAL (OUTPATIENT)
Dept: OBSTETRICS AND GYNECOLOGY | Facility: CLINIC | Age: 37
End: 2022-10-25
Payer: COMMERCIAL

## 2022-10-25 VITALS — WEIGHT: 184.5 LBS | DIASTOLIC BLOOD PRESSURE: 92 MMHG | SYSTOLIC BLOOD PRESSURE: 129 MMHG | BODY MASS INDEX: 30.71 KG/M2

## 2022-10-25 DIAGNOSIS — N91.2 AMENORRHEA: Primary | ICD-10-CM

## 2022-10-25 DIAGNOSIS — R03.0 ELEVATED BP WITHOUT DIAGNOSIS OF HYPERTENSION: ICD-10-CM

## 2022-10-25 DIAGNOSIS — O09.91 SUPERVISION OF HIGH RISK PREGNANCY IN FIRST TRIMESTER: Primary | ICD-10-CM

## 2022-10-25 DIAGNOSIS — R29.898 WEAKNESS OF BOTH HIPS: ICD-10-CM

## 2022-10-25 DIAGNOSIS — O09.521 MULTIGRAVIDA OF ADVANCED MATERNAL AGE IN FIRST TRIMESTER: ICD-10-CM

## 2022-10-25 DIAGNOSIS — M54.50 ACUTE BILATERAL LOW BACK PAIN WITHOUT SCIATICA: Primary | ICD-10-CM

## 2022-10-25 PROCEDURE — 0500F PR INITIAL PRENATAL CARE VISIT: ICD-10-PCS | Mod: CPTII,S$GLB,, | Performed by: OBSTETRICS & GYNECOLOGY

## 2022-10-25 PROCEDURE — 99999 PR PBB SHADOW E&M-EST. PATIENT-LVL III: ICD-10-PCS | Mod: PBBFAC,,, | Performed by: OBSTETRICS & GYNECOLOGY

## 2022-10-25 PROCEDURE — 97110 THERAPEUTIC EXERCISES: CPT | Mod: PN

## 2022-10-25 PROCEDURE — 97112 NEUROMUSCULAR REEDUCATION: CPT | Mod: PN

## 2022-10-25 PROCEDURE — 0500F INITIAL PRENATAL CARE VISIT: CPT | Mod: CPTII,S$GLB,, | Performed by: OBSTETRICS & GYNECOLOGY

## 2022-10-25 PROCEDURE — 99999 PR PBB SHADOW E&M-EST. PATIENT-LVL III: CPT | Mod: PBBFAC,,, | Performed by: OBSTETRICS & GYNECOLOGY

## 2022-10-25 RX ORDER — ASCORBIC ACID, CHOLECALCIFEROL, .ALPHA.-TOCOPHEROL ACETATE, D-, THIAMINE HYDROCHLORIDE, RIBOFLAVIN, NIACIN, PYRIDOXINE HYDROCHLORIDE, LEVOMEFOLATE MAGNESIUM, FOLIC ACID, CYANOCOBALAMIN, IRON PENTACARBONYL, POTASSIUM IODIDE, MAGNESIUM OXIDE, DOCONEXENT, AND ICOSAPENT 55; 1000; 15; 1.3; 1.8; 5; 35; 600; 400; 14; 31; 200; 30; 200; 15 MG/1; [IU]/1; [IU]/1; MG/1; MG/1; MG/1; MG/1; UG/1; UG/1; UG/1; MG/1; UG/1; MG/1; MG/1; MG/1
1 CAPSULE, LIQUID FILLED ORAL
COMMUNITY
Start: 2022-06-09

## 2022-10-25 NOTE — PATIENT INSTRUCTIONS
Headaches  Magnesium oxide 400 mg nightly  Riboflavin 400 mg daily  Caffeine (during the day, <200 mg daily)  Benadryl (at night)  2 extra strength (do not exceed 3000 mg tylenol daily)  Gatorade    Unisom 1/2 tab for sleep

## 2022-10-25 NOTE — PROGRESS NOTES
Initial OB visit.   Patient seen in ED x 2 for vaginal bleeding. US done on 10/5 noted IUP with FHTs 110. Initial read measures CRL 5w5d but final report measures CRL 6w3d. Patient reports no further bleeding.  Minimal nausea.   Reviewed food precautions in pregnancy. A to Z book given.  Headaches improved. Reviewed safe treatments in pregnancy.   Patient interested in aneuploidy screening- brochure given for [id number]. We did discuss the option of diagnostic testing with [facility] and she declined.   Hx gastric sleeve - tolerates glucose loads ok. Given BMI, recommended early glucola.   BP elevated today. Reports history of elevated BP at visits in the past. Anxious about pregnancy today.  Recommended baby ASA 12-16 weeks for preeclampsia prevention. Will continue to monitor, discussed connected mom. May treat as CHTN given this history. Will need baseline labs.   Unable to confirm FHTs with handheld US today. Repeat dating scan scheduled.   Initial PNL ordered.   F/U 4 weeks.

## 2022-10-25 NOTE — PROGRESS NOTES
"[facility]   Physical Therapy Treatment Note     Name: Sade Mullins  [facility] Number: [id number]    Therapy Diagnosis:   Encounter Diagnoses   Name Primary?    Acute bilateral low back pain without sciatica Yes    Weakness of both hips      Physician: SHALA Staples NP    Visit Date: 10/25/2022    Physician Orders: PT Eval and Treat dry needling  Medical Diagnosis from Referral: Acute bilateral low back pain without sciatica (M54.50)  Evaluation Date: 10/11/2022  Authorization Period Expiration: 12/31/2022  Plan of Care Expiration: 01/06/2023  Progress note due: 11/11/2022  Visit # / Visits authorized: 2/ 1    PTA Visit #: 0/5      Precautions: Standard, pregnant - approx 9 weeks gestation as of 10/25/2022    Time In: 5:30  Time Out: 6:30  Total Billable Time: 60 minutes    SUBJECTIVE     Pt reports: good tolerance with HEP with only slight improvement in c/o LBP. Reports that she did have intermittent Hx of LBP prior to surgery, but it only worsened once she became pregnant.    She was compliant with home exercise program.  Response to previous treatment: fair  Functional change: none    Pain: 4/10  Location: bilateral low back     OBJECTIVE     Objective Measures updated at progress report unless specified.     Treatment     Sade received the treatments listed below:      therapeutic exercises to develop strength, endurance, ROM, flexibility, posture, and core stabilization for 30 minutes including:    Hamstring stretch - NP  DKTC 3 x 30" holds  Glute set 10 x 10" holds  PPT 10 x 10" holds  Glute set + PPT into bridge 1 x 10 x 5" holds  SL clam 10 x 10" holds  SL hip abd 10 x 10" holds    manual therapy techniques: Myofacial release were applied to the: low back for 5 minutes, including:  Preparation and application of kinesiotape for ray lumbar paraspinal inhibition (2 x I strips) and 1 x I strip for horizontal stabilization over fulcrum point. Patient received education " regarding appropriate care and removal of Kinesiotape. Patient instructed in proper removal techniques if skin irritation occurs.  Pt would be an excellent candidate for cupping in the future if tape no longer helpful or if pt has poor skin reaction.    neuromuscular re-education activities to improve: Balance, Coordination, Kinesthetic, Sense, Proprioception, and Posture for 25 minutes. The following activities were included:  TA activation x 10 breaths  TA with OH UE ball squeeze x 10 breaths  TA with ball press in Sidely[place] x 10 breaths  TA with hip ADD (ball squeeze) x 10 breaths  TA with BKFO x 10 breaths    Add in the future: dual tasks with BUE with simultaneous TA activation    therapeutic activities to improve functional performance for 00  minutes, including:  None today.  Add in the future: TA + (mini) squatting    Patient Education and Home Exercises     Home Exercises Provided and Patient Education Provided     Education provided:   - heavy edu on use of TA activation with exhalation to improve core activation and spine stabilization during strenuous part of an activity    Written Home Exercises Provided: Patient instructed to cont prior HEP. Exercises were reviewed and Sade was able to demonstrate them prior to the end of the session.  Sade demonstrated good  understanding of the education provided. See EMR under Patient Instructions for exercises provided during therapy sessions    ASSESSMENT     Pt presents with good return technique with HEP indicating good compliance at home. Required VC/TC for PPT to reduce lumbar extension during attempts with bridges. Fair TA activation despite VC/TC for activation; added small ball press with BUE to promote circumferential core activation and limit compensatory mm strategies. Improved TA activation following addition of ball press. Pt presents with increased tone/tenderness to ray lumbar paraspinals; initiated kinesiotape to provide passive  stabilization and mm inhibition for pain modulation. Pt verbalized understanding of don/doff schedule, s/s of skin irritation.    Sade Is progressing well towards her goals.   Pt prognosis is Good.     Pt will continue to benefit from skilled outpatient physical therapy to address the deficits listed in the problem list box on initial evaluation, provide pt/family education and to maximize pt's level of independence in the home and community environment.     Pt's spiritual, cultural and educational needs considered and pt agreeable to plan of care and goals.     Anticipated barriers to physical therapy: early pregnancy    Goals:      In 6 weeks,    Goal Status   Patient will be independent with HEP to promote improved therapy outcomes.  STG  not met, progressing   2. Patient will perform palloff press with good control to demonstrate improved core strength STG   not met, progressing   3. Patient will improve B hip MMT to 4-/5 to demonstrate improved strength for functional tasks.  STG   not met, progressing   4. Patient will improve lumbar flexion/rotation ROM by 25% to improve functional mobility.  STG   not met, progressing         In 12 weeks,   Goal Status   5. Patient will be independent with progressed HEP to self manage symptoms.  LTG   not met, progressing   6. Patient will improve B hip MMT to 4/5 to improve strength for functional tasks.  LTG   not met, progressing   7. Patient will report walking for 30 minutes with <2/10 pain 5x/week to promote healthy lifestyle and regular physical exercise.  LTG   not met, progressing   8. Patient will improve FOTO score to </= 26% limited to decrease perceived limitation with maintaining/changing body position.  LTG   not met, progressing   9. Patient will report max pain of 2/10 to improve activity tolerance and picking up kids as needed at work LTG   not met, progressing        PLAN     Continue with POC for core and glute strength, lumbopelvic  stabilization.    Mari Milton, PT

## 2022-11-07 ENCOUNTER — PATIENT MESSAGE (OUTPATIENT)
Dept: REHABILITATION | Facility: OTHER | Age: 37
End: 2022-11-07
Payer: COMMERCIAL

## 2022-11-08 ENCOUNTER — PATIENT MESSAGE (OUTPATIENT)
Dept: REHABILITATION | Facility: OTHER | Age: 37
End: 2022-11-08

## 2022-11-18 ENCOUNTER — PATIENT MESSAGE (OUTPATIENT)
Dept: ADMINISTRATIVE | Facility: OTHER | Age: 37
End: 2022-11-18
Payer: COMMERCIAL

## 2022-11-25 ENCOUNTER — PATIENT MESSAGE (OUTPATIENT)
Dept: ADMINISTRATIVE | Facility: OTHER | Age: 37
End: 2022-11-25
Payer: COMMERCIAL

## 2023-01-13 ENCOUNTER — PATIENT MESSAGE (OUTPATIENT)
Dept: OTHER | Facility: OTHER | Age: 38
End: 2023-01-13
Payer: COMMERCIAL

## 2023-02-10 ENCOUNTER — PATIENT MESSAGE (OUTPATIENT)
Dept: OTHER | Facility: OTHER | Age: 38
End: 2023-02-10
Payer: COMMERCIAL

## 2023-02-24 ENCOUNTER — PATIENT MESSAGE (OUTPATIENT)
Dept: OTHER | Facility: OTHER | Age: 38
End: 2023-02-24
Payer: COMMERCIAL

## 2023-03-10 ENCOUNTER — PATIENT MESSAGE (OUTPATIENT)
Dept: OTHER | Facility: OTHER | Age: 38
End: 2023-03-10
Payer: COMMERCIAL

## 2023-03-17 ENCOUNTER — PATIENT MESSAGE (OUTPATIENT)
Dept: RESEARCH | Facility: HOSPITAL | Age: 38
End: 2023-03-17
Payer: COMMERCIAL

## 2023-03-24 ENCOUNTER — PATIENT MESSAGE (OUTPATIENT)
Dept: OTHER | Facility: OTHER | Age: 38
End: 2023-03-24
Payer: COMMERCIAL

## 2023-04-07 ENCOUNTER — PATIENT MESSAGE (OUTPATIENT)
Dept: OTHER | Facility: OTHER | Age: 38
End: 2023-04-07
Payer: COMMERCIAL

## 2023-04-28 ENCOUNTER — PATIENT MESSAGE (OUTPATIENT)
Dept: OTHER | Facility: OTHER | Age: 38
End: 2023-04-28
Payer: COMMERCIAL

## 2023-07-18 DIAGNOSIS — M54.50 ACUTE BILATERAL LOW BACK PAIN WITHOUT SCIATICA: Primary | ICD-10-CM

## 2023-07-19 ENCOUNTER — CLINICAL SUPPORT (OUTPATIENT)
Dept: REHABILITATION | Facility: OTHER | Age: 38
End: 2023-07-19
Payer: COMMERCIAL

## 2023-07-19 DIAGNOSIS — M54.50 ACUTE BILATERAL LOW BACK PAIN WITHOUT SCIATICA: ICD-10-CM

## 2023-07-19 DIAGNOSIS — M79.604 PAIN IN BOTH LOWER EXTREMITIES: ICD-10-CM

## 2023-07-19 DIAGNOSIS — G89.29 CHRONIC BILATERAL LOW BACK PAIN WITHOUT SCIATICA: ICD-10-CM

## 2023-07-19 DIAGNOSIS — M79.605 PAIN IN BOTH LOWER EXTREMITIES: ICD-10-CM

## 2023-07-19 DIAGNOSIS — M54.50 CHRONIC BILATERAL LOW BACK PAIN WITHOUT SCIATICA: ICD-10-CM

## 2023-07-19 PROBLEM — M54.9 CHRONIC BACK PAIN: Status: ACTIVE | Noted: 2023-07-19

## 2023-07-19 PROCEDURE — 97112 NEUROMUSCULAR REEDUCATION: CPT | Mod: PN

## 2023-07-19 PROCEDURE — 97161 PT EVAL LOW COMPLEX 20 MIN: CPT | Mod: PN

## 2023-07-19 NOTE — PLAN OF CARE
[facility]  Physical Therapy Initial Evaluation    Name: Sade Mullins  Clinic Number: [id number]    Therapy Diagnosis:   Encounter Diagnoses   Name Primary?    Pain in both lower extremities     Chronic bilateral low back pain without sciatica      Physician: SHALA Staples NP    Physician Orders: Physical Therapy Evaluate and Treat  Medical Diagnosis from Referral: Acute bilateral low back pain without sciatica [M54.50]  Evaluation Date: 7/19/2023  Authorization Period Expiration: 12/30/23  Plan of Care Expiration: 7/19/2023 to 10/19/23  Visit # / Visits authorized: 1/1 (pending additional authorization following initial evaluation)     Time In: 0200pm  Time Out: 0300pm  Total Billable Time: 60 minutes    Precautions: Standard    Subjective     Date of onset: 3 months prior    History of current condition - Sade reports: that she is 21 weeks pregnant and she has low back while sleeping and getting in and out of her car. Sitting and standing are difficult for her. Pt reports that she has numbness and tingling going down her let leg. Pt states that she just returned from a trip to hospitals for 10 days and experienced a lot of difficulty in her lower back. Pt has he previous HEP that she perfoms at times. Pt was here in November for her back pain prior and her symptoms were less. Pt is having her first baby on 11/26/23.      Medical History:   No past medical history on file.    Surgical History:   Sade Mullins  has a past surgical history that includes Reduction of both breasts and gastric sleeve.    Medications:   Sade has a current medication list which includes the following prescription(s): tristart dha.    Allergies:   Review of patient's allergies indicates:  No Known Allergies     Imaging: None    Prior Therapy: Yes, at our office in 11/2022  Social History: Pt lives with her  and two dogs.   Occupation:  (standing, squatting,  "walking)   Prior Level of Function: Same as before  Current Level of Function: Same as before    Pain:  Current 10/10, worst 10/10, best 5/10   Location: bilateral back   Description: Sharp  Aggravating Factors: Standing  Easing Factors: changing positions    Pts goals: Pt would like to return to working with no increase in low back pain.    Objective     WNL=within normal limits  WFL=within functional limits  NT=not tested  *=pain    Posture: Forward head, rounded shoulders. Lumbar lordosis.  Palpation: Pain/tenderness to left sacroiliac junction  Sensation: Pt describes L5/S1 radicular symptoms on L.  Deep tendon reflexes: NT    Lumbar Active range of motion  Pain/dysfunction with movement:   Flexion 50*    Extension 20    Right side bending 40    Left side bending 35*    Right rotation 5    Left rotation 5*          Lower extremity manual muscle tests  Right Left   Hip flexion 5/5 3/5   Hip extension 5/5 3+/5   Hip abduction 3/5 3+/5   Hip adduction 4+/5 4/5   Hip internal rotation 5/5 4/5   Hip external rotation 5/5 4+/5   Knee flexion 5/5 5/5   Knee extension 5/5 5/5   Ankle dorsiflexion 5/5 5/5   Ankle plantarflexion 5/5 5/5   Ankle inversion 5/5 5/5   Ankle eversion 5/5 5/5         CMS Impairment/Limitation/Restriction for FOTO Survey    Therapist reviewed FOTO scores for Sade Mullins on 7/19/2023.   FOTO documents entered into [employer] - see Media section.    Limitation Score: 58%  Predicted Goal: 61%    Category: Mobility     TREATMENT     Treatment Time In: 0220pm  Treatment Time Out: 0300pm  Total Treatment time separate from Evaluation: 30 minutes    Neuromuscular re education for 30 minutes for improved balance and proprioception including:   Standing rows with TrA Contraction vs GTB 30x 5" hold  Standing pulldowns with TrA contraction 30x 5"hold  Child's pose x1 min  Bird dog 3x10 repetitions      Home Exercises and Patient Education Provided:    Education provided:   - Findings; prognosis and plan " of care (POC)  - Home exercise program (HEP)  - Modality options  - Therapist contact information    Written Home Exercises Provided: Yes  Exercises were reviewed and Sade was able to demonstrate them prior to the end of the session.  Sade demonstrated good understanding of the education provided.     See EMR under Patient Instructions for exercises provided today.    Assessment     Sade is a 38 y.o. female referred to outpatient Physical Therapy with a medical diagnosis of Acute bilateral low back pain without sciatica [M54.50]. Pt presents to PT with pain, decreased lumbar spine ROM, decreased strength and flexibility, poor posture, and functional deficits with prolonged standing and walking. These deficits are negatively impacting this patient's ability to complete their work duties and activities of daily living.     Pt prognosis is Good.   Pt will benefit from skilled outpatient Physical Therapy to address the deficits stated above and in the chart below, provide pt/family education, and to maximize pt's level of independence.     Plan of care discussed with patient: Yes  Pt's spiritual, cultural and educational needs considered and pt agreeable to plan of care and goals as stated below:     Anticipated Barriers for therapy: None    Medical Necessity is demonstrated by the following  History  Co-morbidities and personal factors that may impact the plan of care Co-morbidities:   young age    Personal Factors:   no deficits     low   Examination  Body Structures and Functions, activity limitations and participation restrictions that may impact the plan of care Body Regions:   back    Body Systems:    gross symmetry  ROM  strength  gross coordinated movement    Participation Restrictions:   Walking    Activity limitations:   Learning and applying knowledge  no deficits    General Tasks and Commands  No Deficits    Communication  No Deficits    Mobility  no deficits    Self care  no deficits    Domestic  Life  No Deficits    Interactions/Relationships  No Deficits    Life Areas  No Deficits    Community and Social Life  No Deficits         low   Clinical Presentation stable and uncomplicated low   Decision Making/ Complexity Score: low     GOALS:  Short Term Goals:    1.) Pt will improve their FOTO score by 5% to return to PLOF. (Progressing, not met)  2.) Pt will decrease their left sided low back pain to 6/10 for improved QOL. (Progressing, not met)  3.) Pt will improve their lumbar flexion AROM to 60 degrees for improved ADL's. (Progressing, not met)  4.) Pt will improve their left hip abduction strength to 5/5 to return to their PLOF. (Progressing, not met)  5.) Pt will become independent with their HEP to improve strength and tolerance to functional activities. (Progressing, not met)    Long Term Goals:  1.) Pt will improve their FOTO score by 10% to return to PLOF. (Progressing, not met)  2.) Pt will decrease their low back pain to 3/10 for improved QOL. (Progressing, not met)  3.) Pt will tolerate working for 4 hours with no increase in left sided low back pain. (Progressing, not met)  4.) Pt will improve their left hip external rotation strength to 5/5 to return to their PLOF. (Progressing, not met)      Plan     Plan of care Certification: 7/19/2023 to 10/19/23    Outpatient Physical Therapy 2 times weekly for 8 weeks to include the following interventions: Therapeutic Exercises, Manual Therapeutic Technique, Neuromuscular Re Education, Therapeutic Activities. Modalities, Kinesiotape prn, and Functional Dry Needling as needed.    Domi Burgess, PT,  DPT, OCS

## 2023-07-19 NOTE — PROGRESS NOTES
[facility]  Physical Therapy Initial Evaluation    Name: Sade Mullins  Clinic Number: [id number]    Therapy Diagnosis:   Encounter Diagnoses   Name Primary?    Pain in both lower extremities     Chronic bilateral low back pain without sciatica      Physician: SHALA Staples NP    Physician Orders: Physical Therapy Evaluate and Treat  Medical Diagnosis from Referral: Acute bilateral low back pain without sciatica [M54.50]  Evaluation Date: 7/19/2023  Authorization Period Expiration: 12/30/23  Plan of Care Expiration: 7/19/2023 to 10/19/23  Visit # / Visits authorized: 1/1 (pending additional authorization following initial evaluation)     Time In: 0200pm  Time Out: 0300pm  Total Billable Time: 60 minutes    Precautions: Standard    Subjective     Date of onset: 3 months prior    History of current condition - Sade reports: that she is 21 weeks pregnant and she has low back while sleeping and getting in and out of her car. Sitting and standing are difficult for her. Pt reports that she has numbness and tingling going down her let leg. Pt states that she just returned from a trip to [facility] for 10 days and experienced a lot of difficulty in her lower back. Pt has he previous HEP that she perfoms at times. Pt was here in November for her back pain prior and her symptoms were less. Pt is having her first baby on 11/26/23.      Medical History:   No past medical history on file.    Surgical History:   Sade Mullins  has a past surgical history that includes Reduction of both breasts and gastric sleeve.    Medications:   Sade has a current medication list which includes the following prescription(s): tristart dha.    Allergies:   Review of patient's allergies indicates:  No Known Allergies     Imaging: None    Prior Therapy: Yes, at our office in 11/2022  Social History: Pt lives with her  and two dogs.   Occupation:  (standing, squatting,  "walking)   Prior Level of Function: Same as before  Current Level of Function: Same as before    Pain:  Current 10/10, worst 10/10, best 5/10   Location: bilateral back   Description: Sharp  Aggravating Factors: Standing  Easing Factors:  changing positions    Pts goals: Pt would like to return to working with no increase in low back pain.    Objective     WNL=within normal limits  WFL=within functional limits  NT=not tested  *=pain    Posture: Forward head, rounded shoulders. Lumbar lordosis.  Palpation: Pain/tenderness to left sacroiliac junction  Sensation: Pt describes L5/S1 radicular symptoms on L.  Deep tendon reflexes: NT    Lumbar Active range of motion  Pain/dysfunction with movement:   Flexion 50*    Extension 20    Right side bending 40    Left side bending 35*    Right rotation 5    Left rotation 5*          Lower extremity manual muscle tests  Right Left   Hip flexion 5/5 3/5   Hip extension 5/5 3+/5   Hip abduction 3/5 3+/5   Hip adduction 4+/5 4/5   Hip internal rotation 5/5 4/5   Hip external rotation 5/5 4+/5   Knee flexion 5/5 5/5   Knee extension 5/5 5/5   Ankle dorsiflexion 5/5 5/5   Ankle plantarflexion 5/5 5/5   Ankle inversion 5/5 5/5   Ankle eversion 5/5 5/5         CMS Impairment/Limitation/Restriction for FOTO Survey    Therapist reviewed FOTO scores for Sade Mullins on 7/19/2023.   FOTO documents entered into [employer] - see Media section.    Limitation Score: 58%  Predicted Goal: 61%    Category: Mobility     TREATMENT     Treatment Time In: 0220pm  Treatment Time Out: 0300pm  Total Treatment time separate from Evaluation: 30 minutes    Neuromuscular re education for 30 minutes for improved balance and proprioception including:   Standing rows with TrA Contraction vs GTB 30x 5" hold  Standing pulldowns with TrA contraction 30x 5"hold  Child's pose x1 min  Bird dog 3x10 repetitions      Home Exercises and Patient Education Provided:    Education provided:   - Findings; prognosis and plan " of care (POC)  - Home exercise program (HEP)  - Modality options  - Therapist contact information    Written Home Exercises Provided: Yes  Exercises were reviewed and Sade was able to demonstrate them prior to the end of the session.  Sade demonstrated good understanding of the education provided.     See EMR under Patient Instructions for exercises provided today.    Assessment     Sade is a 38 y.o. female referred to outpatient Physical Therapy with a medical diagnosis of Acute bilateral low back pain without sciatica [M54.50]. Pt presents to PT with pain, decreased lumbar spine ROM, decreased strength and flexibility, poor posture, and functional deficits with prolonged standing and walking. These deficits are negatively impacting this patient's ability to complete their work duties and activities of daily living.     Pt prognosis is Good.   Pt will benefit from skilled outpatient Physical Therapy to address the deficits stated above and in the chart below, provide pt/family education, and to maximize pt's level of independence.     Plan of care discussed with patient: Yes  Pt's spiritual, cultural and educational needs considered and pt agreeable to plan of care and goals as stated below:     Anticipated Barriers for therapy: None    Medical Necessity is demonstrated by the following  History  Co-morbidities and personal factors that may impact the plan of care Co-morbidities:   young age    Personal Factors:   no deficits     low   Examination  Body Structures and Functions, activity limitations and participation restrictions that may impact the plan of care Body Regions:   back    Body Systems:    gross symmetry  ROM  strength  gross coordinated movement    Participation Restrictions:   Walking    Activity limitations:   Learning and applying knowledge  no deficits    General Tasks and Commands  No Deficits    Communication  No Deficits    Mobility  no deficits    Self care  no deficits    Domestic  Life  No Deficits    Interactions/Relationships  No Deficits    Life Areas  No Deficits    Community and Social Life  No Deficits         low   Clinical Presentation stable and uncomplicated low   Decision Making/ Complexity Score: low     GOALS:  Short Term Goals:    1.) Pt will improve their FOTO score by 5% to return to PLOF. (Progressing, not met)  2.) Pt will decrease their left sided low back pain to 6/10 for improved QOL. (Progressing, not met)  3.) Pt will improve their lumbar flexion AROM to 60 degrees for improved ADL's. (Progressing, not met)  4.) Pt will improve their left hip abduction strength to 5/5 to return to their PLOF. (Progressing, not met)  5.) Pt will become independent with their HEP to improve strength and tolerance to functional activities. (Progressing, not met)    Long Term Goals:  1.) Pt will improve their FOTO score by 10% to return to PLOF. (Progressing, not met)  2.) Pt will decrease their low back pain to 3/10 for improved QOL. (Progressing, not met)  3.) Pt will tolerate working for 4 hours with no increase in left sided low back pain. (Progressing, not met)  4.) Pt will improve their left hip external rotation strength to 5/5 to return to their PLOF. (Progressing, not met)      Plan     Plan of care Certification: 7/19/2023 to 10/19/23    Outpatient Physical Therapy 2 times weekly for 8 weeks to include the following interventions: Therapeutic Exercises, Manual Therapeutic Technique, Neuromuscular Re Education, Therapeutic Activities. Modalities, Kinesiotape prn, and Functional Dry Needling as needed.    Domi Burgess, PT,  DPT, OCS

## 2023-07-21 ENCOUNTER — CLINICAL SUPPORT (OUTPATIENT)
Dept: REHABILITATION | Facility: OTHER | Age: 38
End: 2023-07-21
Payer: COMMERCIAL

## 2023-07-21 DIAGNOSIS — M54.50 CHRONIC BILATERAL LOW BACK PAIN WITHOUT SCIATICA: ICD-10-CM

## 2023-07-21 DIAGNOSIS — G89.29 CHRONIC BILATERAL LOW BACK PAIN WITHOUT SCIATICA: ICD-10-CM

## 2023-07-21 DIAGNOSIS — M79.605 PAIN IN BOTH LOWER EXTREMITIES: Primary | ICD-10-CM

## 2023-07-21 DIAGNOSIS — M79.604 PAIN IN BOTH LOWER EXTREMITIES: Primary | ICD-10-CM

## 2023-07-21 PROCEDURE — 97112 NEUROMUSCULAR REEDUCATION: CPT | Mod: PN

## 2023-07-21 PROCEDURE — 97110 THERAPEUTIC EXERCISES: CPT | Mod: PN

## 2023-07-21 NOTE — PROGRESS NOTES
"  [facility]   Physical Therapy Treatment Note     Name: Sade Mullins  Clinic Number: [id number]    Therapy Diagnosis:   Encounter Diagnoses   Name Primary?    Pain in both lower extremities Yes    Chronic bilateral low back pain without sciatica      Physician: SHALA Staples NP    Visit Date: 7/21/2023    Physician Orders: PT Eval and Treat dry needling  Medical Diagnosis from Referral: Acute bilateral low back pain without sciatica (M54.50)  Evaluation Date: 10/11/2022  Authorization Period Expiration: 12/31/2022  Plan of Care Expiration: 01/06/2023  Progress note due: 11/11/2022  Visit # / Visits authorized: 1/20    PTA Visit #: 0/5      Precautions: Standard, pregnant - approx 9 weeks gestation as of 10/25/2022    Time In: 230p  Time Out: 330p  Total Billable Time: 60 minutes    SUBJECTIVE     Pt reports: decreased lower extremity radicular symptoms.    She was compliant with home exercise program.  Response to previous treatment: fair  Functional change: none    Pain: 4/10  Location: bilateral low back     OBJECTIVE     Objective Measures updated at progress report unless specified.     Treatment     Sade received the treatments listed below:      therapeutic exercises to develop strength, endurance, ROM, flexibility, posture, and core stabilization for 15 minutes including:    Hamstring stretch - NP  DKTC 3 x 30" holds  Glute set 10 x 10" holds  PPT 10 x 10" holds  Glute set + PPT into bridge 1 x 10 x 5" holds  SL clam 10 x 10" holds  SL hip abd 10 x 10" holds    manual therapy techniques: Myofacial release were applied to the: low back for 5 minutes, including:  Preparation and application of kinesiotape for ray lumbar paraspinal inhibition (2 x I strips) and 1 x I strip for horizontal stabilization over fulcrum point. Patient received education regarding appropriate care and removal of Kinesiotape. Patient instructed in proper removal techniques if skin irritation " occurs.  Pt would be an excellent candidate for cupping in the future if tape no longer helpful or if pt has poor skin reaction.    neuromuscular re-education activities to improve: Balance, Coordination, Kinesthetic, Sense, Proprioception, and Posture for 40 minutes. The following activities were included:  TA activation x 10 breaths  Bridge with belt 3x10  Bird dog 2x10  Standing hip abduction 2x10  Standing hip extension 2x10  TRX squat 3x10  Side stepping vs RTB 2x80'  TA with OH UE ball squeeze x 10 breaths  TA with ball press in Sidelying x 10 breaths  TA with hip ADD (ball squeeze) x 10 breaths  TA with BKFO x 10 breaths    Add in the future: dual tasks with BUE with simultaneous TA activation    therapeutic activities to improve functional performance for 00  minutes, including:  None today.  Add in the future: TA + (mini) squatting    Patient Education and Home Exercises     Home Exercises Provided and Patient Education Provided     Education provided:   - heavy edu on use of TA activation with exhalation to improve core activation and spine stabilization during strenuous part of an activity    Written Home Exercises Provided: Patient instructed to cont prior HEP. Exercises were reviewed and Sade was able to demonstrate them prior to the end of the session.  Sade demonstrated good  understanding of the education provided. See EMR under Patient Instructions for exercises provided during therapy sessions    ASSESSMENT     Pt tolerated therapeutic interventions well with no complaint of increased pain. Patient reported improved gluteal activation with bridge with belt. She stated that she is getting an SIJ stab belt for home. Continue current POC.    Sade Is progressing well towards her goals.   Pt prognosis is Good.     Pt will continue to benefit from skilled outpatient physical therapy to address the deficits listed in the problem list box on initial evaluation, provide pt/family education and  to maximize pt's level of independence in the home and community environment.     Pt's spiritual, cultural and educational needs considered and pt agreeable to plan of care and goals.     Anticipated barriers to physical therapy: early pregnancy    Goals:      In 6 weeks,    Goal Status   Patient will be independent with HEP to promote improved therapy outcomes.  STG  not met, progressing   2. Patient will perform palloff press with good control to demonstrate improved core strength STG   not met, progressing   3. Patient will improve B hip MMT to 4-/5 to demonstrate improved strength for functional tasks.  STG   not met, progressing   4. Patient will improve lumbar flexion/rotation ROM by 25% to improve functional mobility.  STG   not met, progressing         In 12 weeks,   Goal Status   5. Patient will be independent with progressed HEP to self manage symptoms.  LTG   not met, progressing   6. Patient will improve B hip MMT to 4/5 to improve strength for functional tasks.  LTG   not met, progressing   7. Patient will report walking for 30 minutes with <2/10 pain 5x/week to promote healthy lifestyle and regular physical exercise.  LTG   not met, progressing   8. Patient will improve FOTO score to </= 26% limited to decrease perceived limitation with maintaining/changing body position.  LTG   not met, progressing   9. Patient will report max pain of 2/10 to improve activity tolerance and picking up kids as needed at work LTG   not met, progressing        PLAN     Continue with POC for core and glute strength, lumbopelvic stabilization.    Hector Faust, PT

## 2023-08-07 ENCOUNTER — CLINICAL SUPPORT (OUTPATIENT)
Dept: REHABILITATION | Facility: OTHER | Age: 38
End: 2023-08-07
Payer: COMMERCIAL

## 2023-08-07 DIAGNOSIS — M79.605 PAIN IN BOTH LOWER EXTREMITIES: Primary | ICD-10-CM

## 2023-08-07 DIAGNOSIS — M79.604 PAIN IN BOTH LOWER EXTREMITIES: Primary | ICD-10-CM

## 2023-08-07 PROCEDURE — 97140 MANUAL THERAPY 1/> REGIONS: CPT | Mod: PN

## 2023-08-07 PROCEDURE — 97112 NEUROMUSCULAR REEDUCATION: CPT | Mod: PN

## 2023-08-07 PROCEDURE — 97110 THERAPEUTIC EXERCISES: CPT | Mod: PN

## 2023-08-07 NOTE — PROGRESS NOTES
"  [facility]   Physical Therapy Treatment Note     Name: Sade Mullins  [facility] Number: [id number]    Therapy Diagnosis:   Encounter Diagnosis   Name Primary?    Pain in both lower extremities Yes       Physician: SHALA Staples NP    Visit Date: 8/7/2023    Physician Orders: PT Eval and Treat dry needling  Medical Diagnosis from Referral: Acute bilateral low back pain without sciatica (M54.50)  Evaluation Date: 10/11/2022  Authorization Period Expiration: 12/31/2022  Plan of Care Expiration: 01/06/2023  Progress note due: 11/11/2022  Visit # / Visits authorized: 2/20    PTA Visit #: 0/5      Precautions: Standard, pregnant - approx 9 weeks gestation as of 10/25/2022    Time In:   Time Out: 330p  Total Billable Time: 60 minutes    SUBJECTIVE     Pt reports: she had a very long day and is hurting a lot more today. She describes the pain as excruciating      She was compliant with home exercise program.  Response to previous treatment: fair  Functional change: none    Pain: 9/10  Location: bilateral low back     OBJECTIVE     Objective Measures updated at progress report unless specified.     Treatment     Sade received the treatments listed below:      therapeutic exercises to develop strength, endurance, ROM, flexibility, posture, and core stabilization for 15 minutes including:    Hamstring stretch - NP  DKTC 3 x 30" holds  Glute set 10 x 10" holds  PPT 10 x 10" holds  Glute set + PPT into bridge 1 x 10 x 5" holds  +Diaphragmatic breathing 10 breaths 5"    SL clam 10 x 10" holds  SL hip abd 10 x 10" holds    manual therapy techniques: Myofacial release were applied to the: low back for 15 minutes, including:  Preparation and application of kinesiotape for ray lumbar paraspinal inhibition (2 x I strips) and 1 x I strip for horizontal stabilization over fulcrum point. Patient received education regarding appropriate care and removal of Kinesiotape. Patient instructed in proper " removal techniques if skin irritation occurs.  +LAD-LLE  Seated MET for L ant innom    neuromuscular re-education activities to improve: Balance, Coordination, Kinesthetic, Sense, Proprioception, and Posture for 30 minutes. The following activities were included:    TA activation x 10 breaths  Bridge with belt 3x10  Bird dog 2x10  Standing hip abduction 2x10  Standing hip extension 2x10  TRX squat 3x10  Side stepping vs RTB 2x80'  TA with OH UE ball squeeze x 10 breaths  TA with ball press in Sidelying x 10 breaths  TA with hip ADD (ball squeeze) x 10 breaths  TA with BKFO x 10 breaths  +Pelvic tilts on blue yoga ball x 2 minutes   +Pelvic circles on blue yoga ball CW/CCW x 1 minute ea  +Seated marching on blue yoga ball w/ TrA activation x 10 ea      Add in the future: dual tasks with BUE with simultaneous TA activation    therapeutic activities to improve functional performance for 00  minutes, including:  None today.  Add in the future: TA + (mini) squatting    Patient Education and Home Exercises     Home Exercises Provided and Patient Education Provided     Education provided:   - heavy edu on use of TA activation with exhalation to improve core activation and spine stabilization during strenuous part of an activity    Written Home Exercises Provided: Patient instructed to cont prior HEP. Exercises were reviewed and Sade was able to demonstrate them prior to the end of the session.  Sade demonstrated good  understanding of the education provided. See EMR under Patient Instructions for exercises provided during therapy sessions    ASSESSMENT   Left anterior innominate found, w seated MET technique performed this visit. Pt reports decrease in pain during MET technique. Significant relief also reported with LAD on LLE. Emphasis placed on nervous system down training, breathing and proper core activation as pt arrived to PT session with a significant increase in pain after a stressful day. K-tape applied  with no adverse reactions with pt instructed on proper removal and wear time. Plan to continue to progress exercises per pts tolerance.     Sade Is progressing well towards her goals.   Pt prognosis is Good.     Pt will continue to benefit from skilled outpatient physical therapy to address the deficits listed in the problem list box on initial evaluation, provide pt/family education and to maximize pt's level of independence in the home and community environment.     Pt's spiritual, cultural and educational needs considered and pt agreeable to plan of care and goals.     Anticipated barriers to physical therapy: early pregnancy    Goals:      In 6 weeks,    Goal Status   Patient will be independent with HEP to promote improved therapy outcomes.  STG  not met, progressing   2. Patient will perform palloff press with good control to demonstrate improved core strength STG   not met, progressing   3. Patient will improve B hip MMT to 4-/5 to demonstrate improved strength for functional tasks.  STG   not met, progressing   4. Patient will improve lumbar flexion/rotation ROM by 25% to improve functional mobility.  STG   not met, progressing         In 12 weeks,   Goal Status   5. Patient will be independent with progressed HEP to self manage symptoms.  LTG   not met, progressing   6. Patient will improve B hip MMT to 4/5 to improve strength for functional tasks.  LTG   not met, progressing   7. Patient will report walking for 30 minutes with <2/10 pain 5x/week to promote healthy lifestyle and regular physical exercise.  LTG   not met, progressing   8. Patient will improve FOTO score to </= 26% limited to decrease perceived limitation with maintaining/changing body position.  LTG   not met, progressing   9. Patient will report max pain of 2/10 to improve activity tolerance and picking up kids as needed at work LTG   not met, progressing        PLAN     Continue with POC for core and glute strength, lumbopelvic  stabilization.    Fabio Buckley, PT

## 2023-08-09 ENCOUNTER — CLINICAL SUPPORT (OUTPATIENT)
Dept: REHABILITATION | Facility: OTHER | Age: 38
End: 2023-08-09
Payer: COMMERCIAL

## 2023-08-09 DIAGNOSIS — G89.29 CHRONIC BILATERAL LOW BACK PAIN WITHOUT SCIATICA: ICD-10-CM

## 2023-08-09 DIAGNOSIS — M79.604 PAIN IN BOTH LOWER EXTREMITIES: Primary | ICD-10-CM

## 2023-08-09 DIAGNOSIS — M54.50 CHRONIC BILATERAL LOW BACK PAIN WITHOUT SCIATICA: ICD-10-CM

## 2023-08-09 DIAGNOSIS — M79.605 PAIN IN BOTH LOWER EXTREMITIES: Primary | ICD-10-CM

## 2023-08-09 PROCEDURE — 97014 ELECTRIC STIMULATION THERAPY: CPT | Mod: PN

## 2023-08-09 PROCEDURE — 97112 NEUROMUSCULAR REEDUCATION: CPT | Mod: PN

## 2023-08-09 PROCEDURE — 97110 THERAPEUTIC EXERCISES: CPT | Mod: PN

## 2023-08-09 PROCEDURE — 97140 MANUAL THERAPY 1/> REGIONS: CPT | Mod: PN

## 2023-08-09 NOTE — PROGRESS NOTES
"  [facility]   Physical Therapy Treatment Note     Name: Sade Mullins  [facility] Number: [id number]    Therapy Diagnosis:   Encounter Diagnoses   Name Primary?    Pain in both lower extremities Yes    Chronic bilateral low back pain without sciatica        Physician: SHALA Staples NP    Visit Date: 8/9/2023    Physician Orders: PT Eval and Treat dry needling  Medical Diagnosis from Referral: Acute bilateral low back pain without sciatica (M54.50)  Evaluation Date: 10/11/2022  Authorization Period Expiration: 12/31/2022  Plan of Care Expiration: 01/06/2023  Progress note due: 11/11/2022  Visit # / Visits authorized: 2/20    PTA Visit #: 0/5      Precautions: Standard, pregnant - approx 9 weeks gestation as of 10/25/2022    Time In:   Time Out: 330p  Total Billable Time: 60 minutes    SUBJECTIVE     Pt reports: she had a very long day and is hurting a lot more today. She describes the pain as excruciating      She was compliant with home exercise program.  Response to previous treatment: fair  Functional change: none    Pain: 9/10  Location: bilateral low back     OBJECTIVE     Objective Measures updated at progress report unless specified.     Treatment     Sade received the treatments listed below:      therapeutic exercises to develop strength, endurance, ROM, flexibility, posture, and core stabilization for 15 minutes including:    Hamstring stretch - NP  DKTC 3 x 30" holds  Glute set 10 x 10" holds  PPT 10 x 10" holds  Glute set + PPT into bridge 1 x 10 x 5" holds  +Diaphragmatic breathing 10 breaths 5"    SL clam 10 x 10" holds  SL hip abd 10 x 10" holds    manual therapy techniques: Myofacial release were applied to the: low back for 15 minutes, including:  Preparation and application of kinesiotape for ray lumbar paraspinal inhibition (2 x I strips) and 1 x I strip for horizontal stabilization over fulcrum point. Patient received education regarding appropriate care and " removal of Kinesiotape. Patient instructed in proper removal techniques if skin irritation occurs.  +LAD-LLE  Seated MET for L ant innom    Application of TDN: Pt educated on benefits and potential side effects of dry needling. Educated pt on benefits, precautions, side effects following TDN. Educated pt to use heat following treatment sessions if pt is experiencing pain or soreness. Pt verbalized good understanding of education.  Pt signed written consent to dry needling. Pt gave verbal consent for DN    Pt received dry needling to the below listed muscles using 75 mm needles.    Bilateral L2-5 S1 multifidi    With application of electrical stimulation treatment    Pt tolerated treatment well with no adverse events noted.     neuromuscular re-education activities to improve: Balance, Coordination, Kinesthetic, Sense, Proprioception, and Posture for 10 minutes. The following activities were included:    TA activation x 10 breaths  Bridge with belt 3x10  Bird dog 2x10  Standing hip abduction 2x10  Standing hip extension 2x10  TRX squat 3x10  Side stepping vs RTB 2x80'  TA with OH UE ball squeeze x 10 breaths  TA with ball press in Sidelying x 10 breaths  TA with hip ADD (ball squeeze) x 10 breaths  TA with BKFO x 10 breaths  +Pelvic tilts on blue yoga ball x 2 minutes   +Pelvic circles on blue yoga ball CW/CCW x 1 minute ea  +Seated marching on blue yoga ball w/ TrA activation x 10 ea      Add in the future: dual tasks with BUE with simultaneous TA activation    therapeutic activities to improve functional performance for 00  minutes, including:  None today.  Add in the future: TA + (mini) squatting    Patient Education and Home Exercises     Home Exercises Provided and Patient Education Provided     Education provided:   - heavy edu on use of TA activation with exhalation to improve core activation and spine stabilization during strenuous part of an activity    Written Home Exercises Provided: Patient instructed to  cont prior HEP. Exercises were reviewed and Sade was able to demonstrate them prior to the end of the session.  Sade demonstrated good  understanding of the education provided. See EMR under Patient Instructions for exercises provided during therapy sessions    ASSESSMENT     Pt tolerated therapeutic interventions well with no complaint of increased pain. She denied any adverse reaction to dry needling treatment. Increased myofascial tension was noted at right > left lumbar multifidi. We will continue to progress as tolerated.    Sade Is progressing well towards her goals.   Pt prognosis is Good.     Pt will continue to benefit from skilled outpatient physical therapy to address the deficits listed in the problem list box on initial evaluation, provide pt/family education and to maximize pt's level of independence in the home and community environment.     Pt's spiritual, cultural and educational needs considered and pt agreeable to plan of care and goals.     Anticipated barriers to physical therapy: early pregnancy    Goals:      In 6 weeks,    Goal Status   Patient will be independent with HEP to promote improved therapy outcomes.  STG  not met, progressing   2. Patient will perform palloff press with good control to demonstrate improved core strength STG   not met, progressing   3. Patient will improve B hip MMT to 4-/5 to demonstrate improved strength for functional tasks.  STG   not met, progressing   4. Patient will improve lumbar flexion/rotation ROM by 25% to improve functional mobility.  STG   not met, progressing         In 12 weeks,   Goal Status   5. Patient will be independent with progressed HEP to self manage symptoms.  LTG   not met, progressing   6. Patient will improve B hip MMT to 4/5 to improve strength for functional tasks.  LTG   not met, progressing   7. Patient will report walking for 30 minutes with <2/10 pain 5x/week to promote healthy lifestyle and regular physical exercise.   LTG   not met, progressing   8. Patient will improve FOTO score to </= 26% limited to decrease perceived limitation with maintaining/changing body position.  LTG   not met, progressing   9. Patient will report max pain of 2/10 to improve activity tolerance and picking up kids as needed at work LTG   not met, progressing        PLAN     Continue with POC for core and glute strength, lumbopelvic stabilization.    Hector Faust, PT

## 2023-08-14 ENCOUNTER — CLINICAL SUPPORT (OUTPATIENT)
Dept: REHABILITATION | Facility: OTHER | Age: 38
End: 2023-08-14
Payer: COMMERCIAL

## 2023-08-14 DIAGNOSIS — M79.605 PAIN IN BOTH LOWER EXTREMITIES: Primary | ICD-10-CM

## 2023-08-14 DIAGNOSIS — M79.604 PAIN IN BOTH LOWER EXTREMITIES: Primary | ICD-10-CM

## 2023-08-14 DIAGNOSIS — G89.29 CHRONIC BILATERAL LOW BACK PAIN WITHOUT SCIATICA: ICD-10-CM

## 2023-08-14 DIAGNOSIS — M54.50 CHRONIC BILATERAL LOW BACK PAIN WITHOUT SCIATICA: ICD-10-CM

## 2023-08-14 PROCEDURE — 97112 NEUROMUSCULAR REEDUCATION: CPT | Mod: PN

## 2023-08-14 PROCEDURE — 97530 THERAPEUTIC ACTIVITIES: CPT | Mod: PN

## 2023-08-14 PROCEDURE — 97110 THERAPEUTIC EXERCISES: CPT | Mod: PN

## 2023-08-14 NOTE — PROGRESS NOTES
"  [facility]   Physical Therapy Treatment Note     Name: Sade Mullins  Clinic Number: [id number]    Therapy Diagnosis:   Encounter Diagnoses   Name Primary?    Pain in both lower extremities Yes    Chronic bilateral low back pain without sciatica        Physician: SHALA Staples NP    Visit Date: 8/14/2023    Physician Orders: PT Eval and Treat dry needling  Medical Diagnosis from Referral: Acute bilateral low back pain without sciatica (M54.50)  Evaluation Date: 10/11/2022  Authorization Period Expiration: 12/31/2022  Plan of Care Expiration: 01/06/2023  Progress note due: 11/11/2022  Visit # / Visits authorized: 2/20    PTA Visit #: 0/5      Precautions: Standard, pregnant - approx 9 weeks gestation as of 10/25/2022    Time In: 0400pm  Time Out: 0500pm  Total Billable Time: 60 minutes    SUBJECTIVE     Pt reports: that she is doing well today. Pt states that her back felt much better after dry needling last time.     She was compliant with home exercise program.  Response to previous treatment: fair  Functional change: none    Pain: 9/10  Location: bilateral low back     OBJECTIVE     Objective Measures updated at progress report unless specified.     Treatment     Sade received the treatments listed below:      therapeutic exercises to develop strength, endurance, ROM, flexibility, posture, and core stabilization for 15 minutes including:    Hamstring stretch - NP  DKTC 3 x 30" holds  Glute set 10 x 10" holds  PPT 10 x 10" holds  Glute set + PPT into bridge 1 x 10 x 5" holds  Diaphragmatic breathing 10 breaths 5"    SL clam 10 x 10" holds  SL hip abd 10 x 10" holds    manual therapy techniques: Myofacial release were applied to the: low back for 15 minutes, including:  Preparation and application of kinesiotape for ray lumbar paraspinal inhibition (2 x I strips) and 1 x I strip for horizontal stabilization over fulcrum point. Patient received education regarding appropriate " care and removal of Kinesiotape. Patient instructed in proper removal techniques if skin irritation occurs.  +LAD-LLE  Seated MET for L ant innom    Application of TDN: Pt educated on benefits and potential side effects of dry needling. Educated pt on benefits, precautions, side effects following TDN. Educated pt to use heat following treatment sessions if pt is experiencing pain or soreness. Pt verbalized good understanding of education.  Pt signed written consent to dry needling. Pt gave verbal consent for DN    Pt received dry needling to the below listed muscles using 75 mm needles.    Bilateral L2-5 S1 multifidi    With application of electrical stimulation treatment    Pt tolerated treatment well with no adverse events noted.     neuromuscular re-education activities to improve: Balance, Coordination, Kinesthetic, Sense, Proprioception, and Posture for 30 minutes. The following activities were included:    TA activation x 10 breaths  Bridge with belt 3x10  Bird dog 2x10  Standing hip abduction 2x10  Standing hip extension 2x10  TRX squat 3x10  Side stepping vs RTB 2x80'  TA with OH UE ball squeeze x 10 breaths  TA with ball press in Sidelying x 10 breaths  TA with hip ADD (ball squeeze) x 10 breaths  TA with BKFO x 10 breaths  Pelvic tilts on blue yoga ball x 2 minutes   Pelvic circles on blue yoga ball CW/CCW x 1 minute ea  Seated marching on blue yoga ball w/ TrA activation x 10 ea      Add in the future: dual tasks with BUE with simultaneous TA activation    therapeutic activities to improve functional performance for 00  minutes, including:  None today.  Add in the future: TA + (mini) squatting    Patient Education and Home Exercises     Home Exercises Provided and Patient Education Provided     Education provided:   - heavy edu on use of TA activation with exhalation to improve core activation and spine stabilization during strenuous part of an activity    Written Home Exercises Provided: Patient  instructed to cont prior HEP. Exercises were reviewed and Sade was able to demonstrate them prior to the end of the session.  Sade demonstrated good  understanding of the education provided. See EMR under Patient Instructions for exercises provided during therapy sessions    ASSESSMENT     Pt tolerated treatment session well today with little/no increase in low back pain. Continue PT POC with emphasis on improving pt's tolerance to standing for work.    aSde Is progressing well towards her goals.   Pt prognosis is Good.     Pt will continue to benefit from skilled outpatient physical therapy to address the deficits listed in the problem list box on initial evaluation, provide pt/family education and to maximize pt's level of independence in the home and community environment.     Pt's spiritual, cultural and educational needs considered and pt agreeable to plan of care and goals.     Anticipated barriers to physical therapy: early pregnancy    Goals:      In 6 weeks,    Goal Status   Patient will be independent with HEP to promote improved therapy outcomes.  STG  not met, progressing   2. Patient will perform palloff press with good control to demonstrate improved core strength STG   not met, progressing   3. Patient will improve B hip MMT to 4-/5 to demonstrate improved strength for functional tasks.  STG   not met, progressing   4. Patient will improve lumbar flexion/rotation ROM by 25% to improve functional mobility.  STG   not met, progressing         In 12 weeks,   Goal Status   5. Patient will be independent with progressed HEP to self manage symptoms.  LTG   not met, progressing   6. Patient will improve B hip MMT to 4/5 to improve strength for functional tasks.  LTG   not met, progressing   7. Patient will report walking for 30 minutes with <2/10 pain 5x/week to promote healthy lifestyle and regular physical exercise.  LTG   not met, progressing   8. Patient will improve FOTO score to </= 26%  limited to decrease perceived limitation with maintaining/changing body position.  LTG   not met, progressing   9. Patient will report max pain of 2/10 to improve activity tolerance and picking up kids as needed at work LTG   not met, progressing        PLAN     Continue with POC for core and glute strength, lumbopelvic stabilization.    Domi Burgess, PT

## 2023-08-16 ENCOUNTER — CLINICAL SUPPORT (OUTPATIENT)
Dept: REHABILITATION | Facility: OTHER | Age: 38
End: 2023-08-16
Payer: COMMERCIAL

## 2023-08-16 DIAGNOSIS — M79.605 PAIN IN BOTH LOWER EXTREMITIES: Primary | ICD-10-CM

## 2023-08-16 DIAGNOSIS — M79.604 PAIN IN BOTH LOWER EXTREMITIES: Primary | ICD-10-CM

## 2023-08-16 PROCEDURE — 97110 THERAPEUTIC EXERCISES: CPT | Mod: PN,CQ

## 2023-08-16 PROCEDURE — 97112 NEUROMUSCULAR REEDUCATION: CPT | Mod: PN,CQ

## 2023-08-16 NOTE — PROGRESS NOTES
"  [facility]   Physical Therapy Treatment Note     Name: Sade Mullins  [facility] Number: [id number]    Therapy Diagnosis:   Encounter Diagnosis   Name Primary?    Pain in both lower extremities Yes       Physician: SHALA Staples NP    Visit Date: 8/16/2023    Physician Orders: PT Eval and Treat dry needling  Medical Diagnosis from Referral: Acute bilateral low back pain without sciatica (M54.50)  Evaluation Date: 10/11/2022  Authorization Period Expiration: 12/31/2022  Plan of Care Expiration: 01/06/2023  Progress note due: 11/11/2022  Visit # / Visits authorized: 6/20       Precautions: Standard, pregnant - approx 9 weeks gestation as of 10/25/2022    Time In: 1545  Time Out: 1630  Total Billable Time: 45 minutes    SUBJECTIVE     Pt reports: that she is doing well today. States the DN helped last visit and gave her some relief.     She was compliant with home exercise program.  Response to previous treatment: felt better after DN  Functional change: none    Pain: 8/10  Location: bilateral low back     OBJECTIVE     Objective Measures updated at progress report unless specified.     Treatment     Sade received the treatments listed below:      therapeutic exercises to develop strength, endurance, ROM, flexibility, posture, and core stabilization for 15 minutes including:    Hamstring stretch - NP  DKTC on green ball, 10x10"  Glute set 10 x 10" holds  PPT 3" hold x 20  +Seated silver SB rollouts, 10x10"  Glute set + PPT into bridge 1 x 10 x 5" holds  Diaphragmatic breathing 10 breaths 5"    SL clam 10 x 10" holds  SL hip abd 10 x 10" holds    manual therapy techniques: Myofacial release were applied to the: low back for 00 minutes, including:  Preparation and application of kinesiotape for ray lumbar paraspinal inhibition (2 x I strips) and 1 x I strip for horizontal stabilization over fulcrum point. Patient received education regarding appropriate care and removal of " "Kinesiotape. Patient instructed in proper removal techniques if skin irritation occurs.  +LAD-LLE  Seated MET for L ant innom    Application of TDN: Pt educated on benefits and potential side effects of dry needling. Educated pt on benefits, precautions, side effects following TDN. Educated pt to use heat following treatment sessions if pt is experiencing pain or soreness. Pt verbalized good understanding of education.  Pt signed written consent to dry needling. Pt gave verbal consent for DN    Pt received dry needling to the below listed muscles using 75 mm needles.    Bilateral L2-5 S1 multifidi    With application of electrical stimulation treatment    Pt tolerated treatment well with no adverse events noted.     neuromuscular re-education activities to improve: Balance, Coordination, Kinesthetic, Sense, Proprioception, and Posture for 30 minutes. The following activities were included:    TA activation x 10 breaths  +TrA 5" hold 20x  Bridge with belt 3x10  Bird dog 2x10  Standing hip abduction 2x10  Standing hip extension 2x10  TRX squat 3x10  Side stepping vs RTB 2x80'  TA with OH UE ball squeeze x 10 breaths  TA with ball press in Sidelying x 10 breaths  TA with hip ADD (ball squeeze) x 10 breaths  TA with BKFO x 10 breaths  Pelvic tilts on blue yoga ball x 20x  +Seated 1# wand flex on blue yoga ball 20x  +Rows with TrA, RTB 3x10  +Pallof press RTB, 20x ea    Pelvic circles on blue yoga ball CW/CCW x 1 minute ea  Seated marching on blue yoga ball w/ TrA activation x 10 ea      Add in the future: dual tasks with BUE with simultaneous TA activation    therapeutic activities to improve functional performance for 00  minutes, including:  None today.  Add in the future: TA + (mini) squatting    Patient Education and Home Exercises     Home Exercises Provided and Patient Education Provided     Education provided:   - heavy edu on use of TA activation with exhalation to improve core activation and spine stabilization " during strenuous part of an activity    Written Home Exercises Provided: Patient instructed to cont prior HEP. Exercises were reviewed and Sade was able to demonstrate them prior to the end of the session.  Sade demonstrated good  understanding of the education provided. See EMR under Patient Instructions for exercises provided during therapy sessions    ASSESSMENT     Good tolerance with there-ex this visit.  Emphasis was focused on improving postural awareness and decreasing anterior pelvic tilt. She was able to demonstrated improved TrA activation after cuing.      Sade Is progressing well towards her goals.   Pt prognosis is Good.     Pt will continue to benefit from skilled outpatient physical therapy to address the deficits listed in the problem list box on initial evaluation, provide pt/family education and to maximize pt's level of independence in the home and community environment.     Pt's spiritual, cultural and educational needs considered and pt agreeable to plan of care and goals.     Anticipated barriers to physical therapy: early pregnancy    Goals:      In 6 weeks,    Goal Status   Patient will be independent with HEP to promote improved therapy outcomes.  STG  not met, progressing   2. Patient will perform palloff press with good control to demonstrate improved core strength STG   not met, progressing   3. Patient will improve B hip MMT to 4-/5 to demonstrate improved strength for functional tasks.  STG   not met, progressing   4. Patient will improve lumbar flexion/rotation ROM by 25% to improve functional mobility.  STG   not met, progressing         In 12 weeks,   Goal Status   5. Patient will be independent with progressed HEP to self manage symptoms.  LTG   not met, progressing   6. Patient will improve B hip MMT to 4/5 to improve strength for functional tasks.  LTG   not met, progressing   7. Patient will report walking for 30 minutes with <2/10 pain 5x/week to promote healthy  lifestyle and regular physical exercise.  LTG   not met, progressing   8. Patient will improve FOTO score to </= 26% limited to decrease perceived limitation with maintaining/changing body position.  LTG   not met, progressing   9. Patient will report max pain of 2/10 to improve activity tolerance and picking up kids as needed at work LTG   not met, progressing        PLAN     Continue with POC for core and glute strength, lumbopelvic stabilization.    Wei Fisher, PTA

## 2023-08-21 ENCOUNTER — CLINICAL SUPPORT (OUTPATIENT)
Dept: REHABILITATION | Facility: OTHER | Age: 38
End: 2023-08-21
Payer: COMMERCIAL

## 2023-08-21 DIAGNOSIS — M79.604 PAIN IN BOTH LOWER EXTREMITIES: Primary | ICD-10-CM

## 2023-08-21 DIAGNOSIS — G89.29 CHRONIC LOW BACK PAIN WITH SCIATICA, SCIATICA LATERALITY UNSPECIFIED, UNSPECIFIED BACK PAIN LATERALITY: ICD-10-CM

## 2023-08-21 DIAGNOSIS — M54.40 CHRONIC LOW BACK PAIN WITH SCIATICA, SCIATICA LATERALITY UNSPECIFIED, UNSPECIFIED BACK PAIN LATERALITY: ICD-10-CM

## 2023-08-21 DIAGNOSIS — M79.605 PAIN IN BOTH LOWER EXTREMITIES: Primary | ICD-10-CM

## 2023-08-21 PROCEDURE — 97112 NEUROMUSCULAR REEDUCATION: CPT | Mod: PN

## 2023-08-21 PROCEDURE — 97140 MANUAL THERAPY 1/> REGIONS: CPT | Mod: PN

## 2023-08-21 PROCEDURE — 97110 THERAPEUTIC EXERCISES: CPT | Mod: PN

## 2023-08-21 NOTE — PROGRESS NOTES
"  [facility]   Physical Therapy Treatment Note     Name: Sade Mullins  [facility] Number: [id number]    Therapy Diagnosis:   Encounter Diagnoses   Name Primary?    Pain in both lower extremities Yes    Chronic low back pain with sciatica, sciatica laterality unspecified, unspecified back pain laterality          Physician: SHALA Staples NP    Visit Date: 8/21/2023    Physician Orders: PT Eval and Treat dry needling  Medical Diagnosis from Referral: Acute bilateral low back pain without sciatica (M54.50)  Evaluation Date: 10/11/2022  Authorization Period Expiration: 12/31/2022  Plan of Care Expiration: 01/06/2023  Progress note due: 11/11/2022  Visit # / Visits authorized: 6/20       Precautions: Standard, pregnant - approx 9 weeks gestation as of 10/25/2022    Time In: 4:00 pm  Time Out: 5:00 pm   Total Billable Time: 60 minutes    SUBJECTIVE     Pt reports: she is feeling about the same today. She has good days and bad days. She ordered her SI belt and hopefully it will be in soon     She was compliant with home exercise program.  Response to previous treatment: felt better after DN  Functional change: none    Pain: 7/10  Location: bilateral low back     OBJECTIVE     Objective Measures updated at progress report unless specified.     Treatment     Sade received the treatments listed below:      therapeutic exercises to develop strength, endurance, ROM, flexibility, posture, and core stabilization for 15 minutes including:    Hamstring stretch - NP  DKTC on green ball, 10x10"  Glute set 10 x 10" holds  PPT 3" hold x 20  Seated silver SB rollouts, 10x10"  Glute set + PPT into bridge 1 x 10 x 5" holds  Diaphragmatic breathing 10 breaths 5"    SL clam 10 x 10" holds  SL hip abd 10 x 10" holds    manual therapy techniques: Myofacial release were applied to the: low back for 15 minutes, including:  Preparation and application of kinesiotape for ray lumbar paraspinal inhibition (2 x I " "strips) and 1 x I strip for horizontal stabilization over fulcrum point. Patient received education regarding appropriate care and removal of Kinesiotape. Patient instructed in proper removal techniques if skin irritation occurs.  +LAD-LLE  Seated MET for L ant innom    Application of TDN: Pt educated on benefits and potential side effects of dry needling. Educated pt on benefits, precautions, side effects following TDN. Educated pt to use heat following treatment sessions if pt is experiencing pain or soreness. Pt verbalized good understanding of education.  Pt signed written consent to dry needling. Pt gave verbal consent for DN    Pt received dry needling to the below listed muscles using 75 mm needles.    Bilateral L2-5 S1 multifidi    With application of electrical stimulation treatment    Pt tolerated treatment well with no adverse events noted.     neuromuscular re-education activities to improve: Balance, Coordination, Kinesthetic, Sense, Proprioception, and Posture for 30 minutes. The following activities were included:    TA activation x 10 breaths  +TrA 5" hold 20x  Bridge with belt 3x10  +Standing lat pull down w/ marching RTB 2x10   Bird dog 2x10  Standing hip abduction 2x10  Standing hip extension 2x10  TRX squat 3x10  Side stepping vs RTB 2x80'  TA with OH UE ball squeeze x 10 breaths  TA with ball press in Sidelying x 10 breaths  TA with hip ADD (ball squeeze) x 10 breaths  TA with BKFO x 10 breaths  Pelvic tilts on blue yoga ball x 20x  Seated 1# wand flex on blue yoga ball 20x  Rows with TrA, RTB 3x10  Pallof press RTB, 20x ea    Pelvic circles on blue yoga ball CW/CCW x 1 minute ea  Seated marching on blue yoga ball w/ TrA activation x 10 ea      Add in the future: dual tasks with BUE with simultaneous TA activation    therapeutic activities to improve functional performance for 00  minutes, including:  None today.  Add in the future: TA + (mini) squatting    Patient Education and Home Exercises "     Home Exercises Provided and Patient Education Provided     Education provided:   - heavy edu on use of TA activation with exhalation to improve core activation and spine stabilization during strenuous part of an activity    Written Home Exercises Provided: Patient instructed to cont prior HEP. Exercises were reviewed and Sade was able to demonstrate them prior to the end of the session.  Sade demonstrated good  understanding of the education provided. See EMR under Patient Instructions for exercises provided during therapy sessions    ASSESSMENT   Resumed dry needling with pt reporting improvements in pain from 7/10 to 4/10. Continued with lumbopelvic stabilization exercises with no aggravation in symptoms. Discussed plan to schedule pelvic health evaluation for labor prep in October.  Plan to continue to progress exercises per pts tolerance.       Sade Is progressing well towards her goals.   Pt prognosis is Good.     Pt will continue to benefit from skilled outpatient physical therapy to address the deficits listed in the problem list box on initial evaluation, provide pt/family education and to maximize pt's level of independence in the home and community environment.     Pt's spiritual, cultural and educational needs considered and pt agreeable to plan of care and goals.     Anticipated barriers to physical therapy: early pregnancy    Goals:      In 6 weeks,    Goal Status   Patient will be independent with HEP to promote improved therapy outcomes.  STG  not met, progressing   2. Patient will perform palloff press with good control to demonstrate improved core strength STG   not met, progressing   3. Patient will improve B hip MMT to 4-/5 to demonstrate improved strength for functional tasks.  STG   not met, progressing   4. Patient will improve lumbar flexion/rotation ROM by 25% to improve functional mobility.  STG   not met, progressing         In 12 weeks,   Goal Status   5. Patient will be  independent with progressed HEP to self manage symptoms.  LTG   not met, progressing   6. Patient will improve B hip MMT to 4/5 to improve strength for functional tasks.  LTG   not met, progressing   7. Patient will report walking for 30 minutes with <2/10 pain 5x/week to promote healthy lifestyle and regular physical exercise.  LTG   not met, progressing   8. Patient will improve FOTO score to </= 26% limited to decrease perceived limitation with maintaining/changing body position.  LTG   not met, progressing   9. Patient will report max pain of 2/10 to improve activity tolerance and picking up kids as needed at work LTG   not met, progressing        PLAN     Continue with POC for core and glute strength, lumbopelvic stabilization.    Fabio Buckley, PT

## 2023-08-24 ENCOUNTER — CLINICAL SUPPORT (OUTPATIENT)
Dept: REHABILITATION | Facility: OTHER | Age: 38
End: 2023-08-24
Payer: COMMERCIAL

## 2023-08-24 DIAGNOSIS — M79.604 PAIN IN BOTH LOWER EXTREMITIES: Primary | ICD-10-CM

## 2023-08-24 DIAGNOSIS — M79.605 PAIN IN BOTH LOWER EXTREMITIES: Primary | ICD-10-CM

## 2023-08-24 PROCEDURE — 97110 THERAPEUTIC EXERCISES: CPT | Mod: PN,CQ

## 2023-08-24 PROCEDURE — 97112 NEUROMUSCULAR REEDUCATION: CPT | Mod: PN,CQ

## 2023-08-24 NOTE — PROGRESS NOTES
"  [facility]   Physical Therapy Treatment Note     Name: Sade Mullins  [facility] Number: [id number]    Therapy Diagnosis:   Encounter Diagnosis   Name Primary?    Pain in both lower extremities Yes         Physician: SHALA Staples NP    Visit Date: 8/24/2023    Physician Orders: PT Eval and Treat dry needling  Medical Diagnosis from Referral: Acute bilateral low back pain without sciatica (M54.50)  Evaluation Date: 10/11/2022  Authorization Period Expiration: 12/31/2022  Plan of Care Expiration: 01/06/2023  Progress note due: 11/11/2022  Visit # / Visits authorized: 8/20       Precautions: Standard, pregnant - approx 9 weeks gestation as of 10/25/2022    Time In: 1714 ( late arrival )  Time Out: 1800  Total Billable Time: 46 minutes    SUBJECTIVE     Pt reports: she is feeling okay overall pain is a little better however she is still having quite a bit of LBP. HS stretching seems to help with her LBP.     She was compliant with home exercise program.  Response to previous treatment: felt a little better  Functional change: none    Pain: 7/10  Location: bilateral low back     OBJECTIVE     Objective Measures updated at progress report unless specified.     Treatment     Sade received the treatments listed below:      therapeutic exercises to develop strength, endurance, ROM, flexibility, posture, and core stabilization for 8 minutes including:    Supine HS stretch, 3x20" R/L  DKTC on green ball, 10x10"  Glute set 10 x 10" holds  Seated silver SB rollouts, 10x10"  Diaphragmatic breathing 10 breaths 5"    SL clam 10 x 10" holds  SL hip abd 10 x 10" holds    manual therapy techniques: Myofacial release were applied to the: low back for 00 minutes, including:  Preparation and application of kinesiotape for ray lumbar paraspinal inhibition (2 x I strips) and 1 x I strip for horizontal stabilization over fulcrum point. Patient received education regarding appropriate care and removal of " "Kinesiotape. Patient instructed in proper removal techniques if skin irritation occurs.  +LAD-LLE  Seated MET for L ant innom    Application of TDN: Pt educated on benefits and potential side effects of dry needling. Educated pt on benefits, precautions, side effects following TDN. Educated pt to use heat following treatment sessions if pt is experiencing pain or soreness. Pt verbalized good understanding of education.  Pt signed written consent to dry needling. Pt gave verbal consent for DN    Pt received dry needling to the below listed muscles using 75 mm needles.    Bilateral L2-5 S1 multifidi    With application of electrical stimulation treatment    Pt tolerated treatment well with no adverse events noted.     neuromuscular re-education activities to improve: Balance, Coordination, Kinesthetic, Sense, Proprioception, and Posture for 38 minutes. The following activities were included:    TA activation x 10 breaths  PPT 3" hold x 20  +TrA 5" hold 20x  Bridge with belt 3x10 3-5" hold  Standing lat pull down w/ marching RTB 2x10   Bird dog 2x10  Standing hip abduction 2x10  Standing hip extension 2x10  TRX squat 3x10  Side stepping vs RTB 2x80'  TA with OH UE ball squeeze x 10 breaths  TA with ball press in Sidelying x 10 breaths  TA with hip ADD (ball squeeze) x 10 breaths  TA with BKFO x 10 breaths  Pelvic tilts on blue yoga ball x 20x  Seated 1# wand flex on blue yoga ball 20x  Seated on blue yoga ball, rows + TrA, RTB x10  Pallof press RTB, 20x ea    Pelvic circles on blue yoga ball CW/CCW x 1 minute ea  Seated marching on blue yoga ball w/ TrA activation x 10 ea      Add in the future: dual tasks with BUE with simultaneous TA activation    therapeutic activities to improve functional performance for 00  minutes, including:  None today.  Add in the future: TA + (mini) squatting    Patient Education and Home Exercises     Home Exercises Provided and Patient Education Provided     Education provided:   - HEP " execution and exercise form and rationale     Written Home Exercises Provided: Patient instructed to cont prior HEP. Exercises were reviewed and Sade was able to demonstrate them prior to the end of the session.  Sade demonstrated good  understanding of the education provided. See EMR under Patient Instructions for exercises provided during therapy sessions    ASSESSMENT   Sade was able to demonstrate improved TrA activation as well as improved lumbo-pelvic awareness and mobility with PPT. She felt relief in LBP with supine HS stretching as moderate/severe restrictions are notable R>L. Pt is interested in participating in pelvic floor therapy.       Sade Is progressing well towards her goals.   Pt prognosis is Good.     Pt will continue to benefit from skilled outpatient physical therapy to address the deficits listed in the problem list box on initial evaluation, provide pt/family education and to maximize pt's level of independence in the home and community environment.     Pt's spiritual, cultural and educational needs considered and pt agreeable to plan of care and goals.     Anticipated barriers to physical therapy: early pregnancy    Goals:      In 6 weeks,    Goal Status   Patient will be independent with HEP to promote improved therapy outcomes.  STG  not met, progressing   2. Patient will perform palloff press with good control to demonstrate improved core strength STG   not met, progressing   3. Patient will improve B hip MMT to 4-/5 to demonstrate improved strength for functional tasks.  STG   not met, progressing   4. Patient will improve lumbar flexion/rotation ROM by 25% to improve functional mobility.  STG   not met, progressing         In 12 weeks,   Goal Status   5. Patient will be independent with progressed HEP to self manage symptoms.  LTG   not met, progressing   6. Patient will improve B hip MMT to 4/5 to improve strength for functional tasks.  LTG   not met, progressing   7.  Patient will report walking for 30 minutes with <2/10 pain 5x/week to promote healthy lifestyle and regular physical exercise.  LTG   not met, progressing   8. Patient will improve FOTO score to </= 26% limited to decrease perceived limitation with maintaining/changing body position.  LTG   not met, progressing   9. Patient will report max pain of 2/10 to improve activity tolerance and picking up kids as needed at work LTG   not met, progressing        PLAN     Continue with POC for core and glute strength, lumbopelvic stabilization.    Wei Fisher, PTA

## 2023-08-28 ENCOUNTER — CLINICAL SUPPORT (OUTPATIENT)
Dept: REHABILITATION | Facility: OTHER | Age: 38
End: 2023-08-28
Payer: COMMERCIAL

## 2023-08-28 DIAGNOSIS — M79.604 PAIN IN BOTH LOWER EXTREMITIES: Primary | ICD-10-CM

## 2023-08-28 DIAGNOSIS — G89.29 CHRONIC BILATERAL LOW BACK PAIN WITHOUT SCIATICA: ICD-10-CM

## 2023-08-28 DIAGNOSIS — M79.605 PAIN IN BOTH LOWER EXTREMITIES: Primary | ICD-10-CM

## 2023-08-28 DIAGNOSIS — M54.50 CHRONIC BILATERAL LOW BACK PAIN WITHOUT SCIATICA: ICD-10-CM

## 2023-08-28 PROCEDURE — 97140 MANUAL THERAPY 1/> REGIONS: CPT | Mod: PN

## 2023-08-28 PROCEDURE — 97110 THERAPEUTIC EXERCISES: CPT | Mod: PN

## 2023-08-28 PROCEDURE — 97112 NEUROMUSCULAR REEDUCATION: CPT | Mod: PN

## 2023-08-28 NOTE — PROGRESS NOTES
"  [facility]   Physical Therapy Treatment Note     Name: Sade Mullins  Clinic Number: [id number]    Therapy Diagnosis:   Encounter Diagnoses   Name Primary?    Pain in both lower extremities Yes    Chronic bilateral low back pain without sciatica          Physician: SHALA Staples NP    Visit Date: 8/28/2023    Physician Orders: PT Eval and Treat dry needling  Medical Diagnosis from Referral: Acute bilateral low back pain without sciatica (M54.50)  Evaluation Date: 10/11/2022  Authorization Period Expiration: 12/31/2022  Plan of Care Expiration: 01/06/2023  Progress note due: 11/11/2022  Visit # / Visits authorized: 8/20       Precautions: Standard, pregnant - approx 9 weeks gestation as of 10/25/2022    Time In: 1714 ( late arrival )  Time Out: 1800  Total Billable Time: 46 minutes    SUBJECTIVE     Pt reports: she is feeling okay overall pain is a little better however she is still having quite a bit of LBP. HS stretching seems to help with her LBP.     She was compliant with home exercise program.  Response to previous treatment: felt a little better  Functional change: none    Pain: 7/10  Location: bilateral low back     OBJECTIVE     Objective Measures updated at progress report unless specified.     Treatment     Sade received the treatments listed below:      therapeutic exercises to develop strength, endurance, ROM, flexibility, posture, and core stabilization for 8 minutes including:    Supine HS stretch, 3x20" R/L  DKTC on green ball, 10x10"  Glute set 10 x 10" holds  Seated silver SB rollouts, 10x10"  Diaphragmatic breathing 10 breaths 5"    SL clam 10 x 10" holds  SL hip abd 10 x 10" holds    manual therapy techniques: Myofacial release were applied to the: low back for 8 minutes, including:  Preparation and application of kinesiotape for ray lumbar paraspinal inhibition (2 x I strips) and 1 x I strip for horizontal stabilization over fulcrum point. Patient received " "education regarding appropriate care and removal of Kinesiotape. Patient instructed in proper removal techniques if skin irritation occurs.  LAD-LLE  Seated MET for L ant innom    Application of TDN: Pt educated on benefits and potential side effects of dry needling. Educated pt on benefits, precautions, side effects following TDN. Educated pt to use heat following treatment sessions if pt is experiencing pain or soreness. Pt verbalized good understanding of education.  Pt signed written consent to dry needling. Pt gave verbal consent for DN    Pt received dry needling to the below listed muscles using 75 mm needles.    Bilateral L2-5 S1 multifidi    With application of electrical stimulation treatment    Pt tolerated treatment well with no adverse events noted.     Theraroller left lateral hip       neuromuscular re-education activities to improve: Balance, Coordination, Kinesthetic, Sense, Proprioception, and Posture for 30 minutes. The following activities were included:    TA activation x 10 breaths  PPT 3" hold x 20  TrA 5" hold 20x  Bridge with belt 3x10 3-5" hold  Standing lat pull down w/ marching RTB 2x10   Bird dog 2x10  Standing hip abduction 2x10  Standing hip extension 2x10  TRX squat 3x10  Side stepping vs RTB 2x80'  TA with OH UE ball squeeze x 10 breaths  TA with ball press in Sidelying x 10 breaths  TA with hip ADD (ball squeeze) x 10 breaths  TA with BKFO x 10 breaths  Pelvic tilts on blue yoga ball x 20x  Seated 1# wand flex on blue yoga ball 30x  Seated on blue yoga ball, rows + TrA, RTB x10  Pallof press RTB, 20x ea    Pelvic circles on blue yoga ball CW/CCW x 1 minute ea  Seated marching on blue yoga ball w/ TrA activation x 10 ea      Add in the future: dual tasks with BUE with simultaneous TA activation    therapeutic activities to improve functional performance for 00  minutes, including:  None today.  Add in the future: TA + (mini) squatting    Patient Education and Home Exercises     Home " Exercises Provided and Patient Education Provided     Education provided:   - HEP execution and exercise form and rationale     Written Home Exercises Provided: Patient instructed to cont prior HEP. Exercises were reviewed and Sade was able to demonstrate them prior to the end of the session.  Sade demonstrated good  understanding of the education provided. See EMR under Patient Instructions for exercises provided during therapy sessions    ASSESSMENT   Pt tolerated treatment session well today with little/no increase in low back pain. Pt demonstrates improved transverse abdominus activation with mat exercises. Continue PT POC.      Sade Is progressing well towards her goals.   Pt prognosis is Good.     Pt will continue to benefit from skilled outpatient physical therapy to address the deficits listed in the problem list box on initial evaluation, provide pt/family education and to maximize pt's level of independence in the home and community environment.     Pt's spiritual, cultural and educational needs considered and pt agreeable to plan of care and goals.     Anticipated barriers to physical therapy: early pregnancy    Goals:      In 6 weeks,    Goal Status   Patient will be independent with HEP to promote improved therapy outcomes.  STG  not met, progressing   2. Patient will perform palloff press with good control to demonstrate improved core strength STG   not met, progressing   3. Patient will improve B hip MMT to 4-/5 to demonstrate improved strength for functional tasks.  STG   not met, progressing   4. Patient will improve lumbar flexion/rotation ROM by 25% to improve functional mobility.  STG   not met, progressing         In 12 weeks,   Goal Status   5. Patient will be independent with progressed HEP to self manage symptoms.  LTG   not met, progressing   6. Patient will improve B hip MMT to 4/5 to improve strength for functional tasks.  LTG   not met, progressing   7. Patient will report  walking for 30 minutes with <2/10 pain 5x/week to promote healthy lifestyle and regular physical exercise.  LTG   not met, progressing   8. Patient will improve FOTO score to </= 26% limited to decrease perceived limitation with maintaining/changing body position.  LTG   not met, progressing   9. Patient will report max pain of 2/10 to improve activity tolerance and picking up kids as needed at work LTG   not met, progressing        PLAN     Continue with POC for core and glute strength, lumbopelvic stabilization.    Domi Burgess, PT

## 2023-08-30 ENCOUNTER — CLINICAL SUPPORT (OUTPATIENT)
Dept: REHABILITATION | Facility: OTHER | Age: 38
End: 2023-08-30
Payer: COMMERCIAL

## 2023-08-30 DIAGNOSIS — M79.604 PAIN IN BOTH LOWER EXTREMITIES: Primary | ICD-10-CM

## 2023-08-30 DIAGNOSIS — M79.605 PAIN IN BOTH LOWER EXTREMITIES: Primary | ICD-10-CM

## 2023-08-30 PROCEDURE — 97110 THERAPEUTIC EXERCISES: CPT | Mod: PN,CQ

## 2023-08-30 PROCEDURE — 97112 NEUROMUSCULAR REEDUCATION: CPT | Mod: PN,CQ

## 2023-08-30 NOTE — PROGRESS NOTES
"  [facility]   Physical Therapy Treatment Note     Name: Sade Mullins  [facility] Number: [id number]    Therapy Diagnosis:   Encounter Diagnosis   Name Primary?    Pain in both lower extremities Yes         Physician: SHALA Staples NP    Visit Date: 8/30/2023    Physician Orders: PT Eval and Treat dry needling  Medical Diagnosis from Referral: Acute bilateral low back pain without sciatica (M54.50)  Evaluation Date: 10/11/2022  Authorization Period Expiration: 12/31/2022  Plan of Care Expiration: 01/06/2023  Progress note due: 11/11/2022  Visit # / Visits authorized: 10/20       Precautions: Standard, pregnant - approx 9 weeks gestation as of 10/25/2022    Time In: 1700  Time Out: 1800  Total Billable Time: 60 minutes    SUBJECTIVE     Pt reports: she is still having pain in her back/hips. Somewhat better overall.     She was compliant with home exercise program.  Response to previous treatment: felt a little better  Functional change: none today     Pain: 7/10  Location: bilateral low back     OBJECTIVE     Objective Measures updated at progress report unless specified.     Treatment     Sade received the treatments listed below:      therapeutic exercises to develop strength, endurance, ROM, flexibility, posture, and core stabilization for 22 minutes including:    Supine HS stretch, 3x20" R/L  DKTC on green ball, 10x10"  Glute set 10 x 10" holds  Seated silver SB rollouts, w/ R sided bias, 10x10"  Diaphragmatic breathing 10 breaths 5"    SL clam 10 x 10" holds  SL hip abd 10 x 10" holds    manual therapy techniques: Myofacial release were applied to the: low back for 00 minutes, including:  Preparation and application of kinesiotape for ray lumbar paraspinal inhibition (2 x I strips) and 1 x I strip for horizontal stabilization over fulcrum point. Patient received education regarding appropriate care and removal of Kinesiotape. Patient instructed in proper removal techniques if " "skin irritation occurs.  LAD-LLE  Seated MET for L ant innom    Application of TDN: Pt educated on benefits and potential side effects of dry needling. Educated pt on benefits, precautions, side effects following TDN. Educated pt to use heat following treatment sessions if pt is experiencing pain or soreness. Pt verbalized good understanding of education.  Pt signed written consent to dry needling. Pt gave verbal consent for DN    Pt received dry needling to the below listed muscles using 75 mm needles.    Bilateral L2-5 S1 multifidi    With application of electrical stimulation treatment    Pt tolerated treatment well with no adverse events noted.     Theraroller left lateral hip       neuromuscular re-education activities to improve: Balance, Coordination, Kinesthetic, Sense, Proprioception, and Posture for 38 minutes. The following activities were included:    TA activation x 10 breaths  PPT 3" hold x 20  TrA 5" hold 20x  Bridge with  black band, 3x10  Standing lat pull down w/ marching GTB 2x10   +Standing abdominal bracing with red SB on mat, 5" hold x20  +Hesitation marching, 60ftx2   Bird dog 2x10  Standing hip abduction 2x10  Standing hip extension 2x10  TRX squat 3x10  Side stepping vs RTB 2x80'  TA with OH UE ball squeeze x 10 breaths  TA with ball press in Sidelying x 10 breaths  TA with hip ADD (ball squeeze) x 10 breaths  TA with BKFO x 10 breaths  Pelvic tilts on blue yoga ball x 20x  Seated 2# wand flex on blue yoga ball 30x  Seated on blue yoga ball, rows + TrA, GTB x10  Pallof press GTB, 20x ea      Pelvic circles on blue yoga ball CW/CCW x 1 minute ea  Seated marching on blue yoga ball w/ TrA activation x 10 ea      Add in the future: dual tasks with BUE with simultaneous TA activation    therapeutic activities to improve functional performance for 00  minutes, including:  None today.  Add in the future: TA + (mini) squatting    Patient Education and Home Exercises     Home Exercises Provided and " Patient Education Provided     Education provided:   - HEP execution and exercise form and rationale     Written Home Exercises Provided: Patient instructed to cont prior HEP. Exercises were reviewed and Sade was able to demonstrate them prior to the end of the session.  Sade demonstrated good  understanding of the education provided. See EMR under Patient Instructions for exercises provided during therapy sessions    ASSESSMENT   Pt tolerated exercise well this visit. Pt reported pain continues to be present despite therapeutic interventions and is an obstacle at this time from progression. However, she is demonstrating improved posture in both sitting/standing postures and she is able to demonstrate improved TrA activation.     Sade Is progressing well towards her goals.   Pt prognosis is Good.     Pt will continue to benefit from skilled outpatient physical therapy to address the deficits listed in the problem list box on initial evaluation, provide pt/family education and to maximize pt's level of independence in the home and community environment.     Pt's spiritual, cultural and educational needs considered and pt agreeable to plan of care and goals.     Anticipated barriers to physical therapy: early pregnancy    Goals:      In 6 weeks,    Goal Status   Patient will be independent with HEP to promote improved therapy outcomes.  STG  not met, progressing   2. Patient will perform palloff press with good control to demonstrate improved core strength STG   not met, progressing   3. Patient will improve B hip MMT to 4-/5 to demonstrate improved strength for functional tasks.  STG   not met, progressing   4. Patient will improve lumbar flexion/rotation ROM by 25% to improve functional mobility.  STG   not met, progressing         In 12 weeks,   Goal Status   5. Patient will be independent with progressed HEP to self manage symptoms.  LTG   not met, progressing   6. Patient will improve B hip MMT to 4/5  to improve strength for functional tasks.  LTG   not met, progressing   7. Patient will report walking for 30 minutes with <2/10 pain 5x/week to promote healthy lifestyle and regular physical exercise.  LTG   not met, progressing   8. Patient will improve FOTO score to </= 26% limited to decrease perceived limitation with maintaining/changing body position.  LTG   not met, progressing   9. Patient will report max pain of 2/10 to improve activity tolerance and picking up kids as needed at work LTG   not met, progressing        PLAN     Continue with POC for core and glute strength, lumbopelvic stabilization.    Wei Fisher, PTA

## 2023-10-07 ENCOUNTER — PATIENT MESSAGE (OUTPATIENT)
Dept: ORTHOPEDICS | Facility: CLINIC | Age: 38
End: 2023-10-07
Payer: COMMERCIAL

## 2023-10-07 ENCOUNTER — PATIENT MESSAGE (OUTPATIENT)
Dept: REHABILITATION | Facility: OTHER | Age: 38
End: 2023-10-07
Payer: COMMERCIAL

## 2023-10-09 ENCOUNTER — TELEPHONE (OUTPATIENT)
Dept: ORTHOPEDICS | Facility: CLINIC | Age: 38
End: 2023-10-09
Payer: COMMERCIAL

## 2023-10-09 ENCOUNTER — OFFICE VISIT (OUTPATIENT)
Dept: ORTHOPEDICS | Facility: CLINIC | Age: 38
End: 2023-10-09
Payer: COMMERCIAL

## 2023-10-09 ENCOUNTER — HOSPITAL ENCOUNTER (OUTPATIENT)
Dept: RADIOLOGY | Facility: HOSPITAL | Age: 38
Discharge: HOME | End: 2023-10-09
Attending: REGISTERED NURSE
Payer: COMMERCIAL

## 2023-10-09 VITALS — HEIGHT: 65 IN | BODY MASS INDEX: 30.67 KG/M2 | WEIGHT: 184.06 LBS

## 2023-10-09 DIAGNOSIS — M51.36 DDD (DEGENERATIVE DISC DISEASE), LUMBAR: ICD-10-CM

## 2023-10-09 DIAGNOSIS — M54.9 DORSALGIA, UNSPECIFIED: Primary | ICD-10-CM

## 2023-10-09 DIAGNOSIS — M51.36 DDD (DEGENERATIVE DISC DISEASE), LUMBAR: Primary | ICD-10-CM

## 2023-10-09 PROCEDURE — 72110 X-RAY EXAM L-2 SPINE 4/>VWS: CPT | Mod: TC

## 2023-10-09 PROCEDURE — 99214 PR OFFICE/OUTPT VISIT, EST, LEVL IV, 30-39 MIN: ICD-10-PCS | Mod: S$GLB,,, | Performed by: REGISTERED NURSE

## 2023-10-09 PROCEDURE — 99999 PR PBB SHADOW E&M-EST. PATIENT-LVL III: ICD-10-PCS | Mod: PBBFAC,,, | Performed by: REGISTERED NURSE

## 2023-10-09 PROCEDURE — 99214 OFFICE O/P EST MOD 30 MIN: CPT | Mod: S$GLB,,, | Performed by: REGISTERED NURSE

## 2023-10-09 PROCEDURE — 3008F PR BODY MASS INDEX (BMI) DOCUMENTED: ICD-10-PCS | Mod: CPTII,S$GLB,, | Performed by: REGISTERED NURSE

## 2023-10-09 PROCEDURE — 1159F MED LIST DOCD IN RCRD: CPT | Mod: CPTII,S$GLB,, | Performed by: REGISTERED NURSE

## 2023-10-09 PROCEDURE — 72110 XR LUMBAR SPINE AP AND LAT WITH FLEX/EXT: ICD-10-PCS | Mod: 26,,, | Performed by: RADIOLOGY

## 2023-10-09 PROCEDURE — 1159F PR MEDICATION LIST DOCUMENTED IN MEDICAL RECORD: ICD-10-PCS | Mod: CPTII,S$GLB,, | Performed by: REGISTERED NURSE

## 2023-10-09 PROCEDURE — 3008F BODY MASS INDEX DOCD: CPT | Mod: CPTII,S$GLB,, | Performed by: REGISTERED NURSE

## 2023-10-09 PROCEDURE — 99999 PR PBB SHADOW E&M-EST. PATIENT-LVL III: CPT | Mod: PBBFAC,,, | Performed by: REGISTERED NURSE

## 2023-10-09 PROCEDURE — 72110 X-RAY EXAM L-2 SPINE 4/>VWS: CPT | Mod: 26,,, | Performed by: RADIOLOGY

## 2023-10-09 RX ORDER — CYCLOBENZAPRINE HCL 10 MG
10 TABLET ORAL DAILY PRN
Status: ON HOLD | COMMUNITY
Start: 2023-09-30 | End: 2023-12-19 | Stop reason: HOSPADM

## 2023-10-09 RX ORDER — LABETALOL 200 MG/1
200 TABLET, FILM COATED ORAL 2 TIMES DAILY
COMMUNITY
Start: 2023-09-14 | End: 2023-12-04 | Stop reason: CLARIF

## 2023-10-09 RX ORDER — MELOXICAM 15 MG/1
15 TABLET ORAL
Status: ON HOLD | COMMUNITY
Start: 2023-09-20 | End: 2023-12-19 | Stop reason: HOSPADM

## 2023-10-09 RX ORDER — GABAPENTIN 300 MG/1
300 CAPSULE ORAL 3 TIMES DAILY
COMMUNITY
Start: 2023-09-15 | End: 2023-11-20

## 2023-10-09 RX ORDER — NIFEDIPINE 90 MG/1
1 TABLET, FILM COATED, EXTENDED RELEASE ORAL DAILY
COMMUNITY
Start: 2023-09-14 | End: 2023-12-04 | Stop reason: CLARIF

## 2023-10-09 RX ORDER — ESCITALOPRAM OXALATE 10 MG/1
10 TABLET ORAL EVERY MORNING
COMMUNITY
Start: 2023-08-13

## 2023-10-09 RX ORDER — LORAZEPAM 2 MG/1
2 TABLET ORAL EVERY 6 HOURS PRN
COMMUNITY
Start: 2023-09-20

## 2023-10-09 RX ORDER — ESCITALOPRAM OXALATE 5 MG/1
5 TABLET ORAL
COMMUNITY
Start: 2023-09-15 | End: 2023-12-04 | Stop reason: CLARIF

## 2023-10-09 RX ORDER — HYDROXYZINE PAMOATE 25 MG/1
25 CAPSULE ORAL
COMMUNITY
Start: 2023-09-15

## 2023-10-09 NOTE — TELEPHONE ENCOUNTER
Spoke to patient regarding x-ray. Patient is aware of x-ray scheduled at the [facility] for 12:00 noon today. Patient stated thank you. Thanks.

## 2023-10-09 NOTE — PROGRESS NOTES
"DATE: 10/9/2023  PATIENT: Sade Mullins    Attending Physician: Hector Cee M.D.    HISTORY:  Sade Mullins is a 38 y.o. female who returns to me today for follow up.  She was last seen by me 9/2/2022.  Today she is doing well but notes low back and left posterior leg pain.    The Patient denies myelopathic symptoms such as handwriting changes or difficulty with buttons/coins/keys. Denies perineal paresthesias, bowel/bladder dysfunction.    PMH/PSH/FamHx/SocHx:  Unchanged from prior visit    ROS:  REVIEW OF SYSTEMS:  Constitution: Negative. Negative for chills, fever and night sweats.   HENT: Negative for congestion and headaches.    Eyes: Negative for blurred vision, left vision loss and right vision loss.   Cardiovascular: Negative for chest pain and syncope.   Respiratory: Negative for cough and shortness of breath.    Endocrine: Negative for polydipsia, polyphagia and polyuria.   Hematologic/Lymphatic: Negative for bleeding problem. Does not bruise/bleed easily.   Skin: Negative for dry skin, itching and rash.   Musculoskeletal: Negative for falls and muscle weakness.   Gastrointestinal: Negative for abdominal pain and bowel incontinence.   Allergic/Immunologic: Negative for hives and persistent infections.  Genitourinary: Negative for urinary retention/incontinence and nocturia.   Neurological: negative for disturbances in coordination, no myelopathic symptoms such as handwriting changes or difficulty with buttons, coins, keys or small objects. No loss of balance and seizures.   Psychiatric/Behavioral: Negative for depression. The patient does not have insomnia.   Denies perineal paresthesias, bowel or bladder incontinence    EXAM:  Ht 5' 5" (1.651 m)   Wt 83.5 kg (184 lb 1.4 oz)   BMI 30.63 kg/m²   Stable.     IMAGING:    Today I personally re- reviewed AP, Lat and Flex/Ex  upright L-spine that demonstrate mild DDD from L3-S1.      Body mass index is 30.63 kg/m².    No results found for: " ""HGBA1C"      ASSESSMENT/PLAN:    Sade was seen today for low-back pain.    Diagnoses and all orders for this visit:    Dorsalgia, unspecified  -     MRI Lumbar Spine Without Contrast; Future      Today we discussed at length all of the different treatment options including anti-inflammatories, acetaminophen, rest, ice, heat, physical therapy including strengthening and stretching exercises, home exercises, ROM, aerobic conditioning, aqua therapy, other modalities including ultrasound, massage, and dry needling, epidural steroid injections and finally surgical intervention.  STAT MRI ordered, I will call with results and order and CHERYL on the [place].       "

## 2023-10-10 ENCOUNTER — HOSPITAL ENCOUNTER (OUTPATIENT)
Dept: RADIOLOGY | Facility: HOSPITAL | Age: 38
Discharge: HOME | End: 2023-10-10
Attending: REGISTERED NURSE
Payer: COMMERCIAL

## 2023-10-10 DIAGNOSIS — M54.9 DORSALGIA, UNSPECIFIED: ICD-10-CM

## 2023-10-10 PROCEDURE — 72148 MRI LUMBAR SPINE WITHOUT CONTRAST: ICD-10-PCS | Mod: 26,,, | Performed by: RADIOLOGY

## 2023-10-10 PROCEDURE — 72148 MRI LUMBAR SPINE W/O DYE: CPT | Mod: TC

## 2023-10-10 PROCEDURE — 72148 MRI LUMBAR SPINE W/O DYE: CPT | Mod: 26,,, | Performed by: RADIOLOGY

## 2023-10-11 ENCOUNTER — PATIENT MESSAGE (OUTPATIENT)
Dept: ORTHOPEDICS | Facility: CLINIC | Age: 38
End: 2023-10-11
Payer: COMMERCIAL

## 2023-10-12 ENCOUNTER — TELEPHONE (OUTPATIENT)
Dept: PAIN MEDICINE | Facility: CLINIC | Age: 38
End: 2023-10-12
Payer: COMMERCIAL

## 2023-10-12 DIAGNOSIS — M51.26 LUMBAR DISC HERNIATION: Primary | ICD-10-CM

## 2023-10-12 DIAGNOSIS — M51.36 DDD (DEGENERATIVE DISC DISEASE), LUMBAR: Primary | ICD-10-CM

## 2023-10-12 DIAGNOSIS — M54.16 LUMBAR RADICULOPATHY: ICD-10-CM

## 2023-10-12 NOTE — TELEPHONE ENCOUNTER
----- Message from Jin Lizarraga Jr., MD sent at 10/12/2023 10:42 AM CDT -----  Regarding: RE: Order for VIOLA ALEXANDER  OK to schedule for b/l L5 TFESI. Thanks.   ----- Message -----  From: Olivia Mckeon RN  Sent: 10/12/2023  10:40 AM CDT  To: Jin Lizarraga Jr., MD  Subject: FW: Order for VIOLA ALEXANDER                  STAT order received.  ----- Message -----  From: Heidi Ayala PA-C  Sent: 10/12/2023  10:35 AM CDT  To: [facility] Schedulers  Subject: Order for VIOLA ALEXANDER                        Patient Name: VIOLA ALEXANDER([id number])  Sex: Female  : 1985      PCP: DARIO SALMERON    Center: None     Types of orders made on 10/12/2023: Procedure Request    Order Date:10/12/2023  Ordering User:HEIDI AYALA [[id number]]  Encounter Provider:Heidi Ayala PA-C [[id number]]  Authorizing Pro  vider: Heidi Ayala PA-C [[id number]]  Supervising Provider:JOHN CHAMPAGNE [[id number]]  Type of Supervision:Supervision Required  Department:[facility][[id number]]    Common Order Information  Procedure -> Epidural Injection (specify level) Cmt: IL L4-5    Order Specific Information  Order: Procedure Order to Pain Management [Custom: [id number]]  Order #:          [id number]: 1 FUTURE    Priority:   BSTAT  Class: Clinic Performed    Future Order Information      Expires on:10/12/2024            Expected by:10/12/2023                   Associated Diagnoses      M51.26 Lumbar disc herniation      Facility Name: -> [facility]           Priority: STAT  Class: Clinic Performed    Future Order Information      Expires on:10/12/2024            Expected by:10/12/2023                     Associated Diagnoses      M51.26 Lumbar disc herniation      Procedure -> Epidural Injection (specify level) Cmt: IL L4-5        Facility Name: -> [facility]

## 2023-10-12 NOTE — TELEPHONE ENCOUNTER
Pt would like to schedule the bilat L5 TF CHERYL tomorrow 10/13/2023- referring provider states procedure is medically urgent.    Reviewed pre-procedure instructions with Mrs. Edwardelle, as well as provided arrival time of 12:30p for 10/13/2023.  All questions answered- verbalized understanding.

## 2023-10-12 NOTE — PROGRESS NOTES
Spoke with pt virtually. Pt was last seen 10/9/23 and continues to have low back and left leg pain. Pt has tried home exercises and gabapentin with no relief. Pain is 8/10. I provided the patient with a home exercise program. It is the [facility] spine conditioning program. Exercises include head rolls, kneeling back extension, sitting rotation stretch, modified seated side straddle, knee to chest, bird dog, plank, modified seated plank, hip bridges, abdominal bracing, and abdominal crunch. Pt completes each exercise daily for one hour with worsening of pain. MRI demonstrates large posterior disc extrusion L4-L5 resulting in severe central canal narrowing.. Will order STAT medically urgent IL L4-5 CHERYL with pain management. Pt will fu if pain persists, will consider L4-5 discectomy.

## 2023-10-13 ENCOUNTER — HOSPITAL ENCOUNTER (OUTPATIENT)
Facility: HOSPITAL | Age: 38
Discharge: HOME | End: 2023-10-13
Attending: PAIN MEDICINE | Admitting: PAIN MEDICINE
Payer: COMMERCIAL

## 2023-10-13 VITALS
SYSTOLIC BLOOD PRESSURE: 109 MMHG | OXYGEN SATURATION: 98 % | HEART RATE: 79 BPM | TEMPERATURE: 98 F | RESPIRATION RATE: 16 BRPM | DIASTOLIC BLOOD PRESSURE: 69 MMHG

## 2023-10-13 DIAGNOSIS — M54.50 LUMBAR PAIN: ICD-10-CM

## 2023-10-13 DIAGNOSIS — M54.16 LUMBAR RADICULOPATHY: Primary | ICD-10-CM

## 2023-10-13 PROCEDURE — 64483 NJX AA&/STRD TFRM EPI L/S 1: CPT | Mod: 50,,, | Performed by: PAIN MEDICINE

## 2023-10-13 PROCEDURE — 64483 PR EPIDURAL INJ, ANES/STEROID, TRANSFORAMINAL, LUMB/SACR, SNGL LEVL: ICD-10-PCS | Mod: 50,,, | Performed by: PAIN MEDICINE

## 2023-10-13 PROCEDURE — 64483 NJX AA&/STRD TFRM EPI L/S 1: CPT | Mod: LT | Performed by: PAIN MEDICINE

## 2023-10-13 PROCEDURE — 63600175 PHARM REV CODE 636 W HCPCS: Performed by: PAIN MEDICINE

## 2023-10-13 PROCEDURE — 25000003 PHARM REV CODE 250: Performed by: PAIN MEDICINE

## 2023-10-13 PROCEDURE — 25500020 PHARM REV CODE 255: Performed by: PAIN MEDICINE

## 2023-10-13 RX ORDER — LIDOCAINE HYDROCHLORIDE 20 MG/ML
10 INJECTION, SOLUTION INFILTRATION; PERINEURAL ONCE
Status: DISCONTINUED | OUTPATIENT
Start: 2023-10-13 | End: 2023-10-13 | Stop reason: ALTCHOICE

## 2023-10-13 RX ORDER — LIDOCAINE HYDROCHLORIDE 10 MG/ML
INJECTION, SOLUTION EPIDURAL; INFILTRATION; INTRACAUDAL; PERINEURAL
Status: DISCONTINUED
Start: 2023-10-13 | End: 2023-10-13 | Stop reason: HOSPADM

## 2023-10-13 RX ORDER — LIDOCAINE HYDROCHLORIDE 10 MG/ML
INJECTION INFILTRATION; PERINEURAL
Status: DISCONTINUED
Start: 2023-10-13 | End: 2023-10-13 | Stop reason: HOSPADM

## 2023-10-13 RX ORDER — ALPRAZOLAM 0.5 MG/1
1 TABLET, ORALLY DISINTEGRATING ORAL ONCE AS NEEDED
Status: DISCONTINUED | OUTPATIENT
Start: 2023-10-13 | End: 2023-10-13 | Stop reason: HOSPADM

## 2023-10-13 RX ORDER — DEXAMETHASONE SODIUM PHOSPHATE 10 MG/ML
10 INJECTION INTRAMUSCULAR; INTRAVENOUS ONCE
Status: COMPLETED | OUTPATIENT
Start: 2023-10-13 | End: 2023-10-13

## 2023-10-13 RX ORDER — LIDOCAINE HYDROCHLORIDE 10 MG/ML
10 INJECTION INFILTRATION; PERINEURAL ONCE
Status: COMPLETED | OUTPATIENT
Start: 2023-10-13 | End: 2023-10-13

## 2023-10-13 RX ORDER — LIDOCAINE HYDROCHLORIDE 10 MG/ML
1 INJECTION, SOLUTION EPIDURAL; INFILTRATION; INTRACAUDAL; PERINEURAL ONCE
Status: COMPLETED | OUTPATIENT
Start: 2023-10-13 | End: 2023-10-13

## 2023-10-13 RX ORDER — DEXAMETHASONE SODIUM PHOSPHATE 10 MG/ML
INJECTION INTRAMUSCULAR; INTRAVENOUS
Status: DISCONTINUED
Start: 2023-10-13 | End: 2023-10-13 | Stop reason: HOSPADM

## 2023-10-13 NOTE — DISCHARGE SUMMARY
[facility] [place] - Endoscopy  Discharge Note  Short Stay    Procedure(s) (LRB):  Bilateral L5 Transforaminal Epidural Steroid Injections (ref: Jamie) (Bilateral)      OUTCOME: Patient tolerated treatment/procedure well without complication and is now ready for discharge.    DISPOSITION: Home or Self Care    FINAL DIAGNOSIS:  <principal problem not specified>    FOLLOWUP: In clinic    DISCHARGE INSTRUCTIONS:  No discharge procedures on file.       Discharge Diagnosis:DDD (degenerative disc disease), lumbar [M51.36]  Lumbar radiculopathy [M54.16]  Condition on Discharge: Stable.  Diet on Discharge: Same as before.  Activity: as per instruction sheet.  Discharge to: Home with a responsible adult.  Follow up: as per Discharge instructions

## 2023-10-13 NOTE — DISCHARGE INSTRUCTIONS

## 2023-10-13 NOTE — OP NOTE
Lumbar transforaminal CHERYL    Time-out taken to identify patient and procedure side prior to starting the procedure.   I attest that I have reviewed the patient's home medications prior to the procedure and no contraindication have been identified. I  re-evaluated the patient after the patient was positioned for the procedure in the procedure room immediately before the procedural time-out. The vital signs are current and represent the current state of the patient which has not significantly changed since the preprocedure assessment.                              Date of Service: 10/13/2023    PCP: Gabby Ordonez MD    Referring Physician:                                     PROCEDURE: Bilateral L5 transforaminal epidural steroid injection under fluoroscopy    REASON FOR PROCEDURE: Bilateral DDD (degenerative disc disease), lumbar [M51.36]  Lumbar radiculopathy [M54.16]    PHYSICIAN: Jin Lizarraga MD    ASSISTANTS:None    MEDICATIONS INJECTED:  Preservative-free dexamethasone 10mg, Xylocaine 1% MPF 3-5ml. 3ml per level. Preservative free, sterile normal saline is used to get larger volume as needed.    LOCAL ANESTHETIC INJECTED:  Xylocaine 1% 3ml per site.    SEDATION MEDICATIONS: None    ESTIMATED BLOOD LOSS:  None.    COMPLICATIONS:  None.    TECHNIQUE:   Laying in a prone position, the patient was prepped and draped in the usual sterile fashion using ChloraPrep and fenestrated drape.  The area to be injected was determined under fluoroscopic guidance.  Local anesthetic was given by raising a wheal and going down to the hub of a 27-gauge 1.25 inch needle.  The 4.75 inch 25-gauge spinal needle was introduced towards the transverse process of each above named nerve root level.  The needle was walked medially then hinged into the neural foramen.  Omnipaque was injected to confirm appropriate placement and that there was no vascular runoff.  The medication was then injected after applying negative pressure. The  patient tolerated the procedure well.    PAIN BEFORE THE PROCEDURE: 8/10.    PAIN AFTER THE PROCEDURE: 6/10.    The patient was monitored after the procedure.  Patient was given post procedure and discharge instructions to follow at home.  We will see the patient back in two weeks or the patient may call to inform of status. The patient was discharged in a stable condition.

## 2023-11-20 ENCOUNTER — PATIENT MESSAGE (OUTPATIENT)
Dept: ORTHOPEDICS | Facility: CLINIC | Age: 38
End: 2023-11-20
Payer: COMMERCIAL

## 2023-11-20 RX ORDER — GABAPENTIN 600 MG/1
600 TABLET ORAL 3 TIMES DAILY
Qty: 90 TABLET | Refills: 11 | Status: SHIPPED | OUTPATIENT
Start: 2023-11-20 | End: 2024-11-19

## 2023-11-30 DIAGNOSIS — M51.26 LUMBAR DISC HERNIATION: Primary | ICD-10-CM

## 2023-11-30 NOTE — PROGRESS NOTES
Date of Surgery - 12/19/23  Patient class - Outpatient  Provider - Hector Cee  Procedure requested - Lumbar discectomy with laminectomy   Levels: L4/5  Medical Necessity - not urgent  CPT codes: 83817, 50858  post procedural disposition - Amb Surgery/[facility]  estimated length of stay - 0 midnights    Patient first seen:  9/2/22  Patient's most recent visit: 10/9/23  Patient imaging: xrays, MRI.  Results: AP, Lat and Flex/Ex  upright L-spine that demonstrate mild DDD from L3-S1.     Lumbar MRI shows large disc herniation at L4/5 causing moderate stenosis.    Treatment failed: the patient has tried and failed conservative treatment including medications, physical therapy and ESIs.    Medications tried: gabapentin, mobic, robaxin, flexeril, and tylenol  Activity Modification: stopped working out and decreased activity level  Physical therapy (dates, duration, efficacy): Yes. With no relief  Home exercises: Yes.  With increased pain  Epidural steroid injections: Yes.  Dates: 10/13/23 Relief: minimal for 2 days  Functional impairment of ADLs: The patient's severe pain is affecting their quality of life and limiting their daily life, including working and ability to provide self care.     Pertinent physical exam findings: paresthesias or numbness in the lower extremities    *please upload patient clinicals including all notes from Usha Watts PA-C, Heidi Ayala PA-C, MNicholas Staples NP, Hector Cee MD, and/or Ward Clarke MD.  Please also include any and all physical therapy and pain management notes.

## 2023-12-01 ENCOUNTER — TELEPHONE (OUTPATIENT)
Dept: ORTHOPEDICS | Facility: CLINIC | Age: 38
End: 2023-12-01
Payer: COMMERCIAL

## 2023-12-04 ENCOUNTER — TELEPHONE (OUTPATIENT)
Dept: PREADMISSION TESTING | Facility: HOSPITAL | Age: 38
End: 2023-12-04

## 2023-12-04 DIAGNOSIS — Z01.818 PREOPERATIVE TESTING: Primary | ICD-10-CM

## 2023-12-04 NOTE — TELEPHONE ENCOUNTER
----- Message from Alia Lynne RN sent at 12/4/2023 11:54 AM CST -----  Surgery 12/19  Pleas schedule labs and ua.  Thanks!

## 2023-12-04 NOTE — PRE-PROCEDURE INSTRUCTIONS
Patient stated has not had any problem with anesthesia in the past will need labs and ua.  Our  will call to set up these appts.     Preop instructions given. Hold aspirin, aspirin containing products, nsaids(aleve, advil, motrin, ibuprofen, naprosyn, naproxen, voltaren, diclofenac, Mobic , Meloxicam), vitamins and supplements one week prior to surgery.  May take Tylenol.      Medication instructions given:    Disp Refills Start End   cyclobenzaprine (FLEXERIL) 10 MG tablet -- -- 9/30/2023 --   Sig: Take 10 mg by mouth daily as needed.   Class: Historical Med   Route: Alia Gutiérrez RN 12/4/2023 11:31 AM  TAKE IF NEEDED            EScitalopram oxalate (LEXAPRO) 10 MG tablet -- -- 8/13/2023 --   Sig: Take 10 mg by mouth every morning. 2 - 10 MG FOR A TOTAL OF 20 MG   Class: Historical Med   Route: Alia Gutiérrez RN 12/4/2023 11:32 AM  TAKE IN AM OF SURGERY.              gabapentin (NEURONTIN) 600 MG tablet 90 tablet 11 11/20/2023 11/19/2024   Sig: Take 1 tablet (600 mg total) by mouth 3 (three) times daily.   Route: Alia Gutiérrez RN 12/4/2023 11:32 AM  TAKE IN AM OF SURGERY.              hydrOXYzine pamoate (VISTARIL) 25 MG Cap -- -- 9/15/2023 --   Sig: Take 25 mg by mouth as needed.   Class: Historical Med   Route: Alia Gutiérrez RN 12/4/2023 11:33 AM  TAKE IF NEEDED              LORazepam (ATIVAN) 2 MG Tab -- -- 9/20/2023 --   Sig: Take 2 mg by mouth every 6 (six) hours as needed.   Class: Historical Med   Route: Alia Gutiérrez RN 12/4/2023 11:34 AM  TAKE IF NEEDED            melatonin 10 mg Chew -- --  --   Sig: Take by mouth nightly.   Class: Historical Med   Route: Alia Gutiérrez RN 12/4/2023 11:35 AM  TAKE THE NIGHT BEFORE SURGERY.              meloxicam (MOBIC) 15 MG tablet -- -- 9/20/2023 --   Sig: Take 15 mg by mouth.   Class: Historical Med   Route: Oral       Aila Lynne RN 12/4/2023 11:30 AM  HOLD ONE WEEK PRIOR TO SURGERY.             prenatal 93-iron-folate 9-dha (TRISTART DHA) 31 mg iron- 1 mg-200 mg Cap -- -- 6/9/2022 --   Sig: Take 1 capsule by mouth.   Class: Historical Med   Route: Oral       Alia Lynne RN 12/4/2023 11:34 AM  HOLD ONE WEEK PRIOR TO SURGERY.            Your surgery has been scheduled for:Tuesday 12/19/2023__________________________________________    You should report to:  ____[facility], located on the [place] side of the first floor of the           [facility] ([phone])  _x___The Second Floor Surgery Center, located on the [facility] side of the            Second floor of the [facility] ([phone])  ____3rd Floor SSCU located on the [facility] side of the [facility] [phone]  Please Note   Tell your doctor if you take Aspirin, products containing Aspirin, herbal medications  or blood thinners, such as Coumadin, Ticlid, or Plavix.  (Consult your provider regarding holding or stopping before surgery).  Arrange for someone to drive you home following surgery.  You will not be allowed to leave the surgical facility alone or drive yourself home following sedation and anesthesia.  Before Surgery  Stop taking all vitamins/ herbal medications 14days prior to surgery  No Motrin/Advil (Ibuprofen) 7 days before surgery  No Aleve (Naproxen) 7 days before surgery  Stop Taking Asprin, products containing Asprin __7___days before surgery  Stop taking blood thinners_______days before surgery  No Goody's/BC  Powder 7 days before surgery  Refrain from drinking alcoholic beverages for 24hours before and after surgery  Stop or limit smoking _________days before surgery( nonsmoker)  You may take Tylenol for pain  Night before Surgery  Nothing to eat or drink after midnight.  Take a shower or bath (shower is recommended).  Bathe with Hibiclens soap or an antibacterial soap from the neck down.  If not supplied by your surgeon, hibiclens soap will  need to be purchased over the counter in pharmacy.  Rinse soap off thoroughly.  Shampoo your hair with your regular shampoo  The Day of Surgery  NOTHING TO  DRINK 2 hours before arrival time. If you are told to take medication on the morning of surgery, it may be taken with a sip of water.   Take another bath or shower with hibiclens or any antibacterial soap, to reduce the chance of infection.  Take heart and blood pressure medications with a small sip of water, as advised by the perioperative team.  Do not take fluid pills  You may brush your teeth and rinse your mouth, but do not swall any additional water.   Do not apply perfumes, powder, body lotions or deodorant on the day of surgery.  Nail polish should be removed.  Do not wear makeup or moisturizer  Wear comfortable clothes, such as a button front shirt and loose fitting pants.  Leave all jewelry, including body piercings, and valuables at home.    Bring any devices you will neeed after surgery such as crutches or canes.  If you have sleep apnea, please bring your CPAP machine  In the event that your physical condition changes including the onset of a cold or respiratory illness, or if you have to delay or cancel your surgery, please notify your surgeon.   Directions given to the surgery center. Verbalizes understanding.Also sent Preop and med instructions to [facility] portal.

## 2023-12-04 NOTE — ANESTHESIA PAT ROS NOTE
12/04/2023  Sade Mullins is a 38 y.o., female.      Pre-op Assessment          Review of Systems  Anesthesia Hx:   History of prior surgery of interest to airway management or planning: (GASTRIC SLEEVE)          Denies Family Hx of Anesthesia complications.    Denies Personal Hx of Anesthesia complications.                    Social:  Non-Smoker       Hematology/Oncology:  Hematology Normal   Oncology Normal                                   EENT/Dental:  EENT/Dental Normal           Cardiovascular:            Denies Angina.          Functional Capacity 4 METS, ABLE TO CLIMB 2 FLIGHTS OF STAIRS                         Pulmonary:       Denies Shortness of breath.  Denies Recent URI.                 Renal/:  Renal/ Normal                 Hepatic/GI:  Hepatic/GI Normal                 Musculoskeletal:   Musculoskeletal General/Symptoms:  Functional capacity is ambulatory without assistance.          Lumbar Spine Disorders LUMBAR DISC HERNIATION  Neurological:   Neuro Symptoms of pain OF LOWER BACK AND LLE                           Endocrine:        Metabolic Disorders, Obesity / BMI > 30  Psych:  Psychiatric Normal                         Anesthesia Assessment: Preoperative EQUATION    Planned Procedure: Procedure(s) (LRB):  LAMINECTOMY, SPINE, LUMBAR, WITH DISCECTOMY L4/5 tonja retractor SNS:SSEP/EMG (N/A)  Requested Anesthesia Type:General  Surgeon: Hector Cee MD  Service: Neurosurgery  Known or anticipated Date of Surgery:12/19/2023    Surgeon notes: reviewed    Electronic QUestionnaire Assessment completed via nurse interview with patient.        Triage considerations:     The patient has no apparent active cardiac condition (No unstable coronary Syndrome such as severe unstable angina or recent [<1 month] myocardial infarction, decompensated CHF, severe valvular   disease or significant  arrhythmia)    Previous anesthesia records:No problems and Not available    ** H/O GASTRIC SLEEVE**    Last PCP note: outside [facility]   Subspecialty notes: Ortho, OB/GYN    Other important co-morbidities: HTN and Obesity , LUMBAR DISC HERNIATION     Tests already available:  Available tests,  within 3 months , within [facility] .   10/10/2023 MRI LUMBAR SPINE W/O CONTRAST, 10/9/2023 XRAY LUMBAR SPINE AP/LAT W F/E          Instructions given. (See in Nurse's note)    Optimization:  Anesthesia Preop Clinic Assessment  Not Indicated for this surgery       Plan:    Testing:  BMP, Hematology Profile, PT/INR, PTT, T&S, and UA      Patient  has previously scheduled Medical Appointment:12/13 DR. PEARL    Navigation: Tests Scheduled. TBD                        Results will be tracked by Preop Clinic.  12/14 Labs and UA resulted and noted by Dr. Boone.   Alia Lynne RN BSN

## 2023-12-07 DIAGNOSIS — M51.26 LUMBAR DISC HERNIATION: Primary | ICD-10-CM

## 2023-12-07 RX ORDER — TRAMADOL HYDROCHLORIDE 50 MG/1
50 TABLET ORAL EVERY 6 HOURS PRN
Qty: 28 TABLET | Refills: 0 | Status: ON HOLD | OUTPATIENT
Start: 2023-12-07 | End: 2023-12-19 | Stop reason: HOSPADM

## 2023-12-13 ENCOUNTER — LAB VISIT (OUTPATIENT)
Dept: LAB | Facility: HOSPITAL | Age: 38
End: 2023-12-13
Attending: ANESTHESIOLOGY
Payer: COMMERCIAL

## 2023-12-13 ENCOUNTER — OFFICE VISIT (OUTPATIENT)
Dept: ORTHOPEDICS | Facility: CLINIC | Age: 38
End: 2023-12-13
Payer: COMMERCIAL

## 2023-12-13 VITALS — HEIGHT: 65 IN | BODY MASS INDEX: 32.51 KG/M2 | WEIGHT: 195.13 LBS

## 2023-12-13 DIAGNOSIS — Z01.818 PREOPERATIVE TESTING: ICD-10-CM

## 2023-12-13 DIAGNOSIS — M54.16 LUMBAR RADICULOPATHY: Primary | ICD-10-CM

## 2023-12-13 LAB
BILIRUB UR QL STRIP: NEGATIVE
CLARITY UR REFRACT.AUTO: CLEAR
COLOR UR AUTO: YELLOW
GLUCOSE UR QL STRIP: NEGATIVE
HGB UR QL STRIP: NEGATIVE
KETONES UR QL STRIP: NEGATIVE
LEUKOCYTE ESTERASE UR QL STRIP: NEGATIVE
NITRITE UR QL STRIP: NEGATIVE
PH UR STRIP: 6 [PH] (ref 5–8)
PROT UR QL STRIP: NEGATIVE
SP GR UR STRIP: 1.02 (ref 1–1.03)
URN SPEC COLLECT METH UR: NORMAL

## 2023-12-13 PROCEDURE — 99214 PR OFFICE/OUTPT VISIT, EST, LEVL IV, 30-39 MIN: ICD-10-PCS | Mod: S$GLB,,, | Performed by: ORTHOPAEDIC SURGERY

## 2023-12-13 PROCEDURE — 99999 PR PBB SHADOW E&M-EST. PATIENT-LVL III: ICD-10-PCS | Mod: PBBFAC,,, | Performed by: ORTHOPAEDIC SURGERY

## 2023-12-13 PROCEDURE — 99214 OFFICE O/P EST MOD 30 MIN: CPT | Mod: S$GLB,,, | Performed by: ORTHOPAEDIC SURGERY

## 2023-12-13 PROCEDURE — 3008F BODY MASS INDEX DOCD: CPT | Mod: CPTII,S$GLB,, | Performed by: ORTHOPAEDIC SURGERY

## 2023-12-13 PROCEDURE — 81003 URINALYSIS AUTO W/O SCOPE: CPT | Performed by: ANESTHESIOLOGY

## 2023-12-13 PROCEDURE — 3008F PR BODY MASS INDEX (BMI) DOCUMENTED: ICD-10-PCS | Mod: CPTII,S$GLB,, | Performed by: ORTHOPAEDIC SURGERY

## 2023-12-13 PROCEDURE — 99999 PR PBB SHADOW E&M-EST. PATIENT-LVL III: CPT | Mod: PBBFAC,,, | Performed by: ORTHOPAEDIC SURGERY

## 2023-12-18 NOTE — H&P
HISTORY:  Sade Mullins is a 38 y.o. female with presenting with low back and left leg pain.  She was previously seen in September 2022 with low back pain and improve with conservative therapy.  This left leg pain is new and radiates from the back into the buttock/thigh.  She has tried medications, therapy, and an epidural steroid injection without significant improvement.     The Patient denies myelopathic symptoms such as handwriting changes or difficulty with buttons/coins/keys. Denies perineal paresthesias, bowel/bladder dysfunction.     PMH/PSH/FamHx/SocHx:  Unchanged from prior visit     ROS:  REVIEW OF SYSTEMS:  Constitution: Negative. Negative for chills, fever and night sweats.   HENT: Negative for congestion and headaches.    Eyes: Negative for blurred vision, left vision loss and right vision loss.   Cardiovascular: Negative for chest pain and syncope.   Respiratory: Negative for cough and shortness of breath.    Endocrine: Negative for polydipsia, polyphagia and polyuria.   Hematologic/Lymphatic: Negative for bleeding problem. Does not bruise/bleed easily.   Skin: Negative for dry skin, itching and rash.   Musculoskeletal: Negative for falls and muscle weakness.   Gastrointestinal: Negative for abdominal pain and bowel incontinence.   Allergic/Immunologic: Negative for hives and persistent infections.  Genitourinary: Negative for urinary retention/incontinence and nocturia.   Neurological: negative for disturbances in coordination, no myelopathic symptoms such as handwriting changes or difficulty with buttons, coins, keys or small objects. No loss of balance and seizures.  Psychiatric/Behavioral: Negative for depression. The patient does not have insomnia.   Denies perineal paresthesias, bowel or bladder incontinence     EXAM:  General: No apparent distress, the patient is oriented to person, place and time.  Psych: Normal mood and affect  HEENT: Vision grossly intact, hearing intact to the spoken  "word.  Lungs: Respirations unlabored.  Gait: antalgic station and gait, no difficulty with toe or heel walk.   Skin: Dorsal lumbar skin negative for rashes, lesions, hairy patches and surgical scars. There is mild lumbar tenderness to palpation.  Range of motion: Lumbar range of motion is acceptable.  Spinal Balance: Global saggital and coronal spinal balance acceptable, not significant for scoliosis and kyphosis.  Musculoskeletal: No pain with the range of motion of the bilateral hips. No trochanteric tenderness to palpation.  Vascular: Bilateral lower extremities warm and well perfused, dorsalis pedis pulses 2+ bilaterally.  Neurological: Normal strength and tone in all major motor groups in the bilateral lower extremities. Normal sensation to light touch in the L2-S1 dermatomes bilaterally.  Deep tendon reflexes symmetric 2+ in the bilateral lower extremities.  Negative Babinski bilaterally. Straight leg raise negative bilaterally.     IMAGING:     Today I personally re- reviewed AP, Lat and Flex/Ex  upright L-spine that demonstrate mild DDD from L3-S1.      MRI lumbar spine with an L4/5 disc herniation.    Body mass index is 30.63 kg/m².    No results found for: "HGBA1C"        ASSESSMENT/PLAN:     Sade Mullins is a 38 y.o. female with lumbar radiculopathy secondary to an L4-5 disc herniation.  She has failed conservative management including medications and an epidural steroid injection.  Plan for L4/5 discectomy.     "

## 2023-12-19 ENCOUNTER — ANESTHESIA EVENT (OUTPATIENT)
Dept: SURGERY | Facility: HOSPITAL | Age: 38
End: 2023-12-19
Payer: COMMERCIAL

## 2023-12-19 ENCOUNTER — HOSPITAL ENCOUNTER (OUTPATIENT)
Facility: HOSPITAL | Age: 38
Discharge: HOME | End: 2023-12-19
Attending: ORTHOPAEDIC SURGERY | Admitting: ORTHOPAEDIC SURGERY
Payer: COMMERCIAL

## 2023-12-19 ENCOUNTER — ANESTHESIA (OUTPATIENT)
Dept: SURGERY | Facility: HOSPITAL | Age: 38
End: 2023-12-19
Payer: COMMERCIAL

## 2023-12-19 VITALS
WEIGHT: 183 LBS | OXYGEN SATURATION: 98 % | DIASTOLIC BLOOD PRESSURE: 96 MMHG | HEART RATE: 85 BPM | TEMPERATURE: 98 F | HEIGHT: 65 IN | SYSTOLIC BLOOD PRESSURE: 142 MMHG | BODY MASS INDEX: 30.49 KG/M2 | RESPIRATION RATE: 15 BRPM

## 2023-12-19 DIAGNOSIS — M54.50 CHRONIC BILATERAL LOW BACK PAIN WITHOUT SCIATICA: Primary | ICD-10-CM

## 2023-12-19 DIAGNOSIS — M54.16 LUMBAR RADICULOPATHY: ICD-10-CM

## 2023-12-19 DIAGNOSIS — G89.29 CHRONIC BILATERAL LOW BACK PAIN WITHOUT SCIATICA: Primary | ICD-10-CM

## 2023-12-19 LAB
ABO + RH BLD: NORMAL
B-HCG UR QL: NEGATIVE
BLD GP AB SCN CELLS X3 SERPL QL: NORMAL
CTP QC/QA: YES
SPECIMEN OUTDATE: NORMAL

## 2023-12-19 PROCEDURE — 27201423 OPTIME MED/SURG SUP & DEVICES STERILE SUPPLY: Performed by: ORTHOPAEDIC SURGERY

## 2023-12-19 PROCEDURE — 36000711: Performed by: ORTHOPAEDIC SURGERY

## 2023-12-19 PROCEDURE — 25000003 PHARM REV CODE 250

## 2023-12-19 PROCEDURE — 71000016 HC POSTOP RECOV ADDL HR: Performed by: ORTHOPAEDIC SURGERY

## 2023-12-19 PROCEDURE — 71000044 HC DOSC ROUTINE RECOVERY FIRST HOUR: Performed by: ORTHOPAEDIC SURGERY

## 2023-12-19 PROCEDURE — 71000015 HC POSTOP RECOV 1ST HR: Performed by: ORTHOPAEDIC SURGERY

## 2023-12-19 PROCEDURE — 63600175 PHARM REV CODE 636 W HCPCS: Performed by: REGISTERED NURSE

## 2023-12-19 PROCEDURE — 81025 URINE PREGNANCY TEST: CPT | Performed by: ORTHOPAEDIC SURGERY

## 2023-12-19 PROCEDURE — 86901 BLOOD TYPING SEROLOGIC RH(D): CPT | Performed by: PHYSICIAN ASSISTANT

## 2023-12-19 PROCEDURE — 63600175 PHARM REV CODE 636 W HCPCS: Performed by: ORTHOPAEDIC SURGERY

## 2023-12-19 PROCEDURE — 37000008 HC ANESTHESIA 1ST 15 MINUTES: Performed by: ORTHOPAEDIC SURGERY

## 2023-12-19 PROCEDURE — 63030 LAMOT DCMPRN NRV RT 1 LMBR: CPT | Mod: AS,LT,, | Performed by: PHYSICIAN ASSISTANT

## 2023-12-19 PROCEDURE — D9220A PRA ANESTHESIA: Mod: ANES,,, | Performed by: INTERNAL MEDICINE

## 2023-12-19 PROCEDURE — 63030 PR LAMINOTOMY,LUMBAR DISK,1 INTRSP: ICD-10-PCS | Mod: AS,LT,, | Performed by: PHYSICIAN ASSISTANT

## 2023-12-19 PROCEDURE — D9220A PRA ANESTHESIA: ICD-10-PCS | Mod: CRNA,,, | Performed by: REGISTERED NURSE

## 2023-12-19 PROCEDURE — 25000003 PHARM REV CODE 250: Performed by: REGISTERED NURSE

## 2023-12-19 PROCEDURE — 37000009 HC ANESTHESIA EA ADD 15 MINS: Performed by: ORTHOPAEDIC SURGERY

## 2023-12-19 PROCEDURE — 36000710: Performed by: ORTHOPAEDIC SURGERY

## 2023-12-19 PROCEDURE — 25000003 PHARM REV CODE 250: Performed by: PHYSICIAN ASSISTANT

## 2023-12-19 PROCEDURE — 63030 LAMOT DCMPRN NRV RT 1 LMBR: CPT | Mod: LT,,, | Performed by: ORTHOPAEDIC SURGERY

## 2023-12-19 PROCEDURE — D9220A PRA ANESTHESIA: ICD-10-PCS | Mod: ANES,,, | Performed by: INTERNAL MEDICINE

## 2023-12-19 PROCEDURE — D9220A PRA ANESTHESIA: Mod: CRNA,,, | Performed by: REGISTERED NURSE

## 2023-12-19 PROCEDURE — 25000003 PHARM REV CODE 250: Performed by: ORTHOPAEDIC SURGERY

## 2023-12-19 PROCEDURE — 63030 PR LAMINOTOMY,LUMBAR DISK,1 INTRSP: ICD-10-PCS | Mod: LT,,, | Performed by: ORTHOPAEDIC SURGERY

## 2023-12-19 RX ORDER — HALOPERIDOL 5 MG/ML
INJECTION INTRAMUSCULAR
Status: DISCONTINUED | OUTPATIENT
Start: 2023-12-19 | End: 2023-12-19

## 2023-12-19 RX ORDER — OXYCODONE HYDROCHLORIDE 10 MG/1
10 TABLET ORAL EVERY 4 HOURS PRN
Status: CANCELLED | OUTPATIENT
Start: 2023-12-19

## 2023-12-19 RX ORDER — VANCOMYCIN HYDROCHLORIDE 1 G/20ML
INJECTION, POWDER, LYOPHILIZED, FOR SOLUTION INTRAVENOUS
Status: DISCONTINUED | OUTPATIENT
Start: 2023-12-19 | End: 2023-12-19 | Stop reason: HOSPADM

## 2023-12-19 RX ORDER — ACETAMINOPHEN 500 MG
500 TABLET ORAL EVERY 12 HOURS
Qty: 14 TABLET | Refills: 0 | Status: SHIPPED | OUTPATIENT
Start: 2023-12-19 | End: 2023-12-26

## 2023-12-19 RX ORDER — SODIUM CHLORIDE 0.9 % (FLUSH) 0.9 %
10 SYRINGE (ML) INJECTION
Status: CANCELLED | OUTPATIENT
Start: 2023-12-19

## 2023-12-19 RX ORDER — CELECOXIB 100 MG/1
100 CAPSULE ORAL 2 TIMES DAILY
Qty: 14 CAPSULE | Refills: 0 | Status: SHIPPED | OUTPATIENT
Start: 2023-12-19

## 2023-12-19 RX ORDER — LIDOCAINE HYDROCHLORIDE 20 MG/ML
INJECTION INTRAVENOUS
Status: DISCONTINUED | OUTPATIENT
Start: 2023-12-19 | End: 2023-12-19

## 2023-12-19 RX ORDER — DOCUSATE SODIUM 100 MG/1
100 CAPSULE, LIQUID FILLED ORAL 2 TIMES DAILY
Qty: 60 CAPSULE | Refills: 0 | Status: SHIPPED | OUTPATIENT
Start: 2023-12-19

## 2023-12-19 RX ORDER — PROPOFOL 10 MG/ML
VIAL (ML) INTRAVENOUS
Status: DISCONTINUED | OUTPATIENT
Start: 2023-12-19 | End: 2023-12-19

## 2023-12-19 RX ORDER — MUPIROCIN 20 MG/G
OINTMENT TOPICAL
Status: DISCONTINUED | OUTPATIENT
Start: 2023-12-19 | End: 2023-12-19 | Stop reason: HOSPADM

## 2023-12-19 RX ORDER — LIDOCAINE HYDROCHLORIDE AND EPINEPHRINE 10; 10 MG/ML; UG/ML
INJECTION, SOLUTION INFILTRATION; PERINEURAL
Status: DISCONTINUED | OUTPATIENT
Start: 2023-12-19 | End: 2023-12-19 | Stop reason: HOSPADM

## 2023-12-19 RX ORDER — LIDOCAINE HYDROCHLORIDE 10 MG/ML
1 INJECTION, SOLUTION EPIDURAL; INFILTRATION; INTRACAUDAL; PERINEURAL ONCE AS NEEDED
Status: DISCONTINUED | OUTPATIENT
Start: 2023-12-19 | End: 2023-12-19 | Stop reason: HOSPADM

## 2023-12-19 RX ORDER — KETAMINE HCL IN 0.9 % NACL 50 MG/5 ML
SYRINGE (ML) INTRAVENOUS
Status: DISCONTINUED | OUTPATIENT
Start: 2023-12-19 | End: 2023-12-19

## 2023-12-19 RX ORDER — HALOPERIDOL 5 MG/ML
0.5 INJECTION INTRAMUSCULAR EVERY 10 MIN PRN
Status: CANCELLED | OUTPATIENT
Start: 2023-12-19

## 2023-12-19 RX ORDER — HYDROMORPHONE HYDROCHLORIDE 1 MG/ML
0.2 INJECTION, SOLUTION INTRAMUSCULAR; INTRAVENOUS; SUBCUTANEOUS EVERY 5 MIN PRN
Status: DISCONTINUED | OUTPATIENT
Start: 2023-12-19 | End: 2023-12-19 | Stop reason: HOSPADM

## 2023-12-19 RX ORDER — ROCURONIUM BROMIDE 10 MG/ML
INJECTION, SOLUTION INTRAVENOUS
Status: DISCONTINUED | OUTPATIENT
Start: 2023-12-19 | End: 2023-12-19

## 2023-12-19 RX ORDER — OXYCODONE HYDROCHLORIDE 5 MG/1
5 TABLET ORAL
Status: CANCELLED | OUTPATIENT
Start: 2023-12-19

## 2023-12-19 RX ORDER — OXYCODONE HYDROCHLORIDE 5 MG/1
5 TABLET ORAL EVERY 4 HOURS PRN
Qty: 30 TABLET | Refills: 0 | Status: SHIPPED | OUTPATIENT
Start: 2023-12-19

## 2023-12-19 RX ORDER — SODIUM CHLORIDE 0.9 % (FLUSH) 0.9 %
10 SYRINGE (ML) INJECTION
Status: DISCONTINUED | OUTPATIENT
Start: 2023-12-19 | End: 2023-12-19 | Stop reason: HOSPADM

## 2023-12-19 RX ORDER — ONDANSETRON 4 MG/1
4 TABLET, ORALLY DISINTEGRATING ORAL EVERY 8 HOURS PRN
Qty: 60 TABLET | Refills: 0 | Status: SHIPPED | OUTPATIENT
Start: 2023-12-19

## 2023-12-19 RX ORDER — DEXAMETHASONE SODIUM PHOSPHATE 4 MG/ML
INJECTION, SOLUTION INTRA-ARTICULAR; INTRALESIONAL; INTRAMUSCULAR; INTRAVENOUS; SOFT TISSUE
Status: DISCONTINUED | OUTPATIENT
Start: 2023-12-19 | End: 2023-12-19

## 2023-12-19 RX ORDER — PHENYLEPHRINE HYDROCHLORIDE 10 MG/ML
INJECTION INTRAVENOUS
Status: DISCONTINUED | OUTPATIENT
Start: 2023-12-19 | End: 2023-12-19

## 2023-12-19 RX ORDER — MIDAZOLAM HYDROCHLORIDE 1 MG/ML
INJECTION INTRAMUSCULAR; INTRAVENOUS
Status: DISCONTINUED | OUTPATIENT
Start: 2023-12-19 | End: 2023-12-19

## 2023-12-19 RX ORDER — METHOCARBAMOL 500 MG/1
1000 TABLET, FILM COATED ORAL 3 TIMES DAILY
Qty: 120 TABLET | Refills: 0 | Status: SHIPPED | OUTPATIENT
Start: 2023-12-19

## 2023-12-19 RX ORDER — SODIUM CHLORIDE 9 MG/ML
INJECTION, SOLUTION INTRAVENOUS CONTINUOUS
Status: DISCONTINUED | OUTPATIENT
Start: 2023-12-19 | End: 2023-12-19 | Stop reason: HOSPADM

## 2023-12-19 RX ORDER — FENTANYL CITRATE 50 UG/ML
INJECTION, SOLUTION INTRAMUSCULAR; INTRAVENOUS
Status: DISCONTINUED | OUTPATIENT
Start: 2023-12-19 | End: 2023-12-19

## 2023-12-19 RX ORDER — OXYCODONE HYDROCHLORIDE 5 MG/1
5 TABLET ORAL ONCE
Status: COMPLETED | OUTPATIENT
Start: 2023-12-19 | End: 2023-12-19

## 2023-12-19 RX ORDER — BUPIVACAINE HYDROCHLORIDE AND EPINEPHRINE 5; 5 MG/ML; UG/ML
INJECTION, SOLUTION EPIDURAL; INTRACAUDAL; PERINEURAL
Status: DISCONTINUED | OUTPATIENT
Start: 2023-12-19 | End: 2023-12-19 | Stop reason: HOSPADM

## 2023-12-19 RX ADMIN — DEXAMETHASONE SODIUM PHOSPHATE 8 MG: 4 INJECTION, SOLUTION INTRAMUSCULAR; INTRAVENOUS at 07:12

## 2023-12-19 RX ADMIN — Medication 2 G: at 07:12

## 2023-12-19 RX ADMIN — ROCURONIUM BROMIDE 50 MG: 10 INJECTION, SOLUTION INTRAVENOUS at 07:12

## 2023-12-19 RX ADMIN — PHENYLEPHRINE HYDROCHLORIDE 100 MCG: 10 INJECTION INTRAVENOUS at 07:12

## 2023-12-19 RX ADMIN — OXYCODONE HYDROCHLORIDE 5 MG: 5 TABLET ORAL at 10:12

## 2023-12-19 RX ADMIN — MUPIROCIN: 20 OINTMENT TOPICAL at 06:12

## 2023-12-19 RX ADMIN — SODIUM CHLORIDE: 0.9 INJECTION, SOLUTION INTRAVENOUS at 07:12

## 2023-12-19 RX ADMIN — SODIUM CHLORIDE: 0.9 INJECTION, SOLUTION INTRAVENOUS at 08:12

## 2023-12-19 RX ADMIN — PROPOFOL 150 MG: 10 INJECTION, EMULSION INTRAVENOUS at 07:12

## 2023-12-19 RX ADMIN — PHENYLEPHRINE HYDROCHLORIDE 100 MCG: 10 INJECTION INTRAVENOUS at 08:12

## 2023-12-19 RX ADMIN — LIDOCAINE HYDROCHLORIDE 100 MG: 20 INJECTION INTRAVENOUS at 07:12

## 2023-12-19 RX ADMIN — SODIUM CHLORIDE: 9 INJECTION, SOLUTION INTRAVENOUS at 06:12

## 2023-12-19 RX ADMIN — FENTANYL CITRATE 100 MCG: 50 INJECTION, SOLUTION INTRAMUSCULAR; INTRAVENOUS at 07:12

## 2023-12-19 RX ADMIN — MIDAZOLAM HYDROCHLORIDE 2 MG: 2 INJECTION, SOLUTION INTRAMUSCULAR; INTRAVENOUS at 07:12

## 2023-12-19 RX ADMIN — HALOPERIDOL LACTATE 1 MG: 5 INJECTION, SOLUTION INTRAMUSCULAR at 08:12

## 2023-12-19 RX ADMIN — Medication 30 MG: at 07:12

## 2023-12-19 NOTE — DISCHARGE SUMMARY
Nade[facility] Krishna - Surgery ([facility])  Brief Operative Note    Surgery Date: 12/19/2023     Surgeon(s) and Role:     * Hector Cee MD - Primary    Assisting Surgeon: None    Pre-op Diagnosis:  Lumbar disc herniation [M51.26]    Post-op Diagnosis:  Post-Op Diagnosis Codes:     * Lumbar disc herniation [M51.26]    Procedure(s) (LRB):  LAMINECTOMY, SPINE, LUMBAR, WITH DISCECTOMY L4/5 (N/A)    Anesthesia: General    Operative Findings: left L4/5 discectomy    Estimated Blood Loss: * No values recorded between 12/19/2023  7:46 AM and 12/19/2023  8:47 AM *         Specimens:   Specimen (24h ago, onward)      None              Discharge Note    OUTCOME: Patient tolerated treatment/procedure well without complication and is now ready for discharge.    DISPOSITION: Home or Self Care    FINAL DIAGNOSIS:  <principal problem not specified>    FOLLOWUP: In clinic    DISCHARGE INSTRUCTIONS:    Discharge Procedure Orders   Diet general     Call MD for:  temperature >100.4     Call MD for:  persistent nausea and vomiting     Call MD for:  severe uncontrolled pain     Call MD for:  difficulty breathing, headache or visual disturbances     Call MD for:  redness, tenderness, or signs of infection (pain, swelling, redness, odor or green/yellow discharge around incision site)     Call MD for:  hives     Call MD for:  persistent dizziness or light-headedness     Call MD for:  extreme fatigue

## 2023-12-19 NOTE — ANESTHESIA PROCEDURE NOTES
Intubation    Date/Time: 12/19/2023 7:22 AM    Performed by: Ashia Hay CRNA  Authorized by: Katherine Pearson MD    Intubation:     Induction:  Intravenous    Intubated:  Postinduction    Mask Ventilation:  Easy mask    Attempts:  1    Attempted By:  Student    Method of Intubation:  Video laryngoscopy and other (see comments) (attempted DL first)    Blade:  [place] 3    Laryngeal View Grade: Grade I - full view of cords      Difficult Airway Encountered?: No      Complications:  None    Airway Device:  Oral endotracheal tube    Airway Device Size:  7.0    Style/Cuff Inflation:  Cuffed (inflated to minimal occlusive pressure)    Tube secured:  22    Secured at:  The lips    Placement Verified By:  Capnometry (ascultated)    Complicating Factors:  Anterior larynx, overbite, small mouth and retrognathia    Findings Post-Intubation:  BS equal bilateral and atraumatic/condition of teeth unchanged  Notes:      Head and and neck remained in neutral position for induction and intubation

## 2023-12-19 NOTE — PLAN OF CARE
Discharge instructions reviewed with patient at bedside. Verbalized understanding. Packet given. Medication delivered to bedside prior to discharge.

## 2023-12-19 NOTE — PLAN OF CARE
Pt labels reprinted, arm band reprinted. Consents witnessed and re-stickered by RN with pt updated name. Updated armband applied.

## 2023-12-19 NOTE — ANESTHESIA PREPROCEDURE EVALUATION
[facility]  Anesthesia Pre-Operative Evaluation       Patient Name: Sade Ott  YOB: 1985  MRN: [id number]  CSN: [id number]      Code Status: Full Code   Date of Procedure: 12/19/2023  Anesthesia: General Procedure: Procedure(s) (LRB):  LAMINECTOMY, SPINE, LUMBAR, WITH DISCECTOMY L4/5 tonja retractor SNS:SSEP/EMG (N/A)  Pre-Operative Diagnosis: Lumbar disc herniation [M51.26]  Proceduralist: Surgeon(s) and Role:     * Hector Cee MD - Primary        SUBJECTIVE:   Sade Ott is a 38 y.o. female who  has no past medical history on file. No notes on file    Anticoagulants   Medication Route Frequency       she has a current medication list which includes the following long-term medication(s): gabapentin and lorazepam.   ALLERGIES:   Review of patient's allergies indicates:  No Known Allergies  LDA:          Lines/Drains/Airways       None                 MEDICATIONS:     Antibiotics (From admission, onward)      Start     Stop Route Frequency Ordered    12/19/23 0624  mupirocin 2 % ointment         -- Nasl On Call Procedure 12/19/23 0624          VTE Risk Mitigation (From admission, onward)           Ordered     Place sequential compression device  Until discontinued         12/19/23 0634     IP VTE LOW RISK PATIENT  Once         12/19/23 0624     Place sequential compression device  Until discontinued         12/19/23 0624                  Current Facility-Administered Medications   Medication Dose Route Frequency Provider Last Rate Last Admin    0.9%  NaCl infusion   Intravenous Continuous Hector Cee MD        LIDOcaine (PF) 10 mg/ml (1%) injection 10 mg  1 mL Intradermal Once PRN Hector Cee MD        mupirocin 2 % ointment   Nasal On Call Procedure Paco Sam MD              History:   There are no hospital problems to display for this patient.    Surgical History:    has a past surgical history that includes  "Reduction of both breasts; gastric sleeve; and Epidural steroid injection (Bilateral, 10/13/2023).   Social History:    reports being sexually active.  reports that she has never smoked. She has never used smokeless tobacco. She reports current alcohol use. She reports that she does not use drugs.     OBJECTIVE:     Vital Signs (Most Recent):    Vital Signs Range (Last 24H):          There is no height or weight on file to calculate BMI.   Wt Readings from Last 4 Encounters:   12/13/23 88.5 kg (195 lb 1.7 oz)   10/09/23 83.5 kg (184 lb 1.4 oz)   10/25/22 83.7 kg (184 lb 8.4 oz)   10/05/22 83 kg (183 lb)     Significant Labs:  Lab Results   Component Value Date    WBC 6.08 12/13/2023    HGB 10.8 (L) 12/13/2023    HCT 32.8 (L) 12/13/2023     12/13/2023     12/13/2023    K 4.1 12/13/2023     12/13/2023    CREATININE 0.7 12/13/2023    BUN 14 12/13/2023    CO2 25 12/13/2023    GLU 98 12/13/2023    CALCIUM 9.9 12/13/2023    ALKPHOS 59 10/05/2022    ALT 18 10/05/2022    AST 26 10/05/2022    ALBUMIN 5.3 (H) 10/05/2022    INR 0.9 12/13/2023    APTT 25.1 12/13/2023    HCGQUANT 82186 10/05/2022     No LMP recorded.  Recent Results (from the past 72 hour(s))   POCT urine pregnancy    Collection Time: 12/19/23  6:28 AM   Result Value Ref Range    POC Preg Test, Ur Negative Negative     Acceptable Yes        EKG:   No results found for this or any previous visit.    TTE:  No results found for this or any previous visit.  No results found for: "EF"   No results found for this or any previous visit.  DAPHNE:  No results found for this or any previous visit.  Stress Test:  No results found for this or any previous visit.     LHC:  No results found for this or any previous visit.     PFT:  No results found for: "FEV1", "FVC", "RYA5HVP", "TLC", "DLCO"   ASSESSMENT/PLAN:          Pre-op Assessment    I have reviewed the Patient Summary Reports.     I have reviewed the Nursing Notes. I have reviewed the " NPO Status.   I have reviewed the Medications.     Review of Systems  Anesthesia Hx:  No problems with previous Anesthesia             Denies Family Hx of Anesthesia complications.    Denies Personal Hx of Anesthesia complications.                    Social:  Non-Smoker, No Alcohol Use       EENT/Dental:  EENT/Dental Normal           Cardiovascular:  Exercise tolerance: good                                           Pulmonary:  Pulmonary Normal                       Renal/:  Renal/ Normal                 Hepatic/GI:  Hepatic/GI Normal                     Physical Exam  General: Well nourished, Cooperative, Alert and Oriented    Airway:  Mallampati: II   Mouth Opening: Small, but > 3cm  TM Distance: < 4 cm  Tongue: Normal  Neck ROM: Normal ROM    Dental:  Intact    Chest/Lungs:  Clear to auscultation, Normal Respiratory Rate    Heart:  Rate: Normal  Rhythm: Regular Rhythm        Anesthesia Plan  Type of Anesthesia, risks & benefits discussed:    Anesthesia Type: Gen ETT  Intra-op Monitoring Plan: Standard ASA Monitors  Airway Plan: Direct  ASA Score: 2  Anesthesia Plan Notes: Pt recently had a baby. Pt is not currently breast feeding     Ready For Surgery From Anesthesia Perspective.     .

## 2023-12-19 NOTE — TRANSFER OF CARE
"Anesthesia Transfer of Care Note    Patient: Sade Ott    Procedure(s) Performed: Procedure(s) (LRB):  LAMINECTOMY, SPINE, LUMBAR, WITH DISCECTOMY L4/5 (N/A)    Patient location: [facility]    Anesthesia Type: general    Transport from OR: Transported from OR on 6-10 L/min O2 by face mask with adequate spontaneous ventilation    Post pain: adequate analgesia    Post assessment: no apparent anesthetic complications and tolerated procedure well    Post vital signs: stable    Level of consciousness: sedated    Nausea/Vomiting: no nausea/vomiting    Complications: none    Transfer of care protocol was followedComments: Nurse at bedside, VSS, spont reg resp noted  Oral airway remains in place      Last vitals: Visit Vitals  /86   Pulse 86   Temp 36.8 °C (98.2 °F) (Oral)   Resp 15   Ht 5' 5" (1.651 m)   Wt 83 kg (183 lb)   SpO2 100%   Breastfeeding No   BMI 30.45 kg/m²     "

## 2023-12-19 NOTE — OPERATIVE NOTE ADDENDUM
Certification of Assistant at Surgery       Surgery Date: 12/19/2023     Participating Surgeons:  Surgeon(s) and Role:     * Hector Cee MD - Primary    Procedures:  Procedure(s) (LRB):  LAMINECTOMY, SPINE, LUMBAR, WITH DISCECTOMY L4/5 (N/A)    Assistant Surgeon's Certification of Necessity:  I understand that section 1842 (b) (6) (d) of the Social Security Act generally prohibits Medicare Part B reasonable charge payment for the services of assistants at surgery in [facility] hospitals when qualified residents are available to furnish such services. I certify that the services for which payment is claimed were medically necessary, and that no qualified resident was available to perform the services. I further understand that these services are subject to post-payment review by the Medicare carrier.      Usha Nuñez PA-C    12/19/2023  8:46 AM

## 2023-12-19 NOTE — OP NOTE
DATE OF PROCEDURE: 2023.     SURGEON: Hector Cee M.D.     FIRST ASSISTANT SURGEON: Usha Watts PA-C, note no resident was available.     PREOPERATIVE DIAGNOSIS:   Left Lumbar  radiculopathy secondary to  central L4-5 disc herniation.     POSTOPERATIVE DIAGNOSIS:  Left Lumbar  radiculopathy secondary to  central L4-5 disc herniation.     PROCEDURES PERFORMED:  1. Lumbar laminotomy, foraminotomy and disectomy Left L4/5     ANESTHESIA: General endotracheal anesthesia.     ESTIMATED BLOOD LOSS: 25 mL.     IMPLANTS: None.     SPECIMENS: None.     FINDINGS:  large disc fragments consistent with preoperative imaging.     DRAINS: None.     COMPLICATIONS: None.     SPONGE AND NEEDLE COUNT: Correct x2.     NEUROLOGICAL MONITORING: Somatosensory evoked  potential, free-running EMG.  There were no changes to SSEP baselines.  There no significant EMG activity.     REASON FOR OPERATION AND BRIEF HISTORY AND PHYSICAL: Sade Munroe a    38 y.o. female with refractory lumbar  secondary to a central L4-5 disc herniation.  The patient has failed extensive conservative management including a home physical therapy protocol, multiple medications, and epidural steroid injections.  Her refractory radiculopathy is interfering with the care of her  child.  She is here for surgery today.     DESCRIPTION OF INFORMED CONSENT: Please see my last clinic note for a full   description of the informed consent I had with the patient.     DESCRIPTION OF PROCEDURE: The patient was met in the preoperative area where   her low back was marked in the midline by me personally. Subsequently,   She was   brought to the Operating Room where sequential compression devices were placed   on the patient's bilateral calves and run throughout the case. The patient was   then intubated via general endotracheal anesthesia. They were then flipped prone   onto a Tyler table with Juan Jose frame. The head was secured on a facial  pillow. The eyes were personally checked by josep found to be acceptable. The arms were held in a 90-90 position. All bony prominences were padded paying special attention to the axilla, elbows, hands,  breasts, knees, and feet. Being satisfied with positioning and confirming this to be adequate with Anesthesia, the patient's lower back was prepped and draped in normal sterile fashion.     A full timeout was then called identifying the patient, the procedural site and   level, the availability of all instruments and no specific nursing, surgical,   anesthetic or neurological monitoring concerns. Finding that it was safe to   proceed with surgery, the patient was given a weight-appropriate dose of   antibiotics by the Anesthesia Service.     I then infiltrated my planned incision with 10 mL of 1% lidocaine. I then made   a midline lumbar incision and carried dissection down through the skin and  subcutaneous tissues using combination of sharp dissection and electrocautery   where necessary. The dorsal fascia of the lumbar spine was identified and   incised in the midline and I performed a preliminary subperiosteal exposure of   what I took to be the  left L4, and L5 lamina as well as what I took to be   the  left L4-5 facet joint. At the conclusion of my preliminary dissection, I   placed a Penfield 4 elevator under what I took to be the trailing edge of the  left L4  lamina, took lateral radiograph and thus confirmed my level.     At this point, I finalized my exposure. I then used a high-speed drill to thin   the trailing one-third of the  left L4 lamina to reveal the origin of the ligamentum   flavum. I then released the ligamentum flavum from the trailing edge of the  left L4  lamina using a forward-angle curette. I gently worked the ligamentum flavum   distally. This allowed me to visualize the underlying epidural fat and subsequently blue-whitedura. I used the Penfield 4 elevator to move the thecal sac towards  the   midline and brought in a nerve root retractor. An obvious disk bulge was then   seen in this area and I entered it with a disc knife via a vertical annulotomy. Multiple soft   fragments were removed with a pituitary. At the conclusion of my diskectomy, I   could easily pass a Sae elevator along the  left L5 nerve root ventrally as well   as across the thecal sac. The wound was then thoroughly irrigated, including   irrigation of the disk space, which revealed no residual free fragments. I then  finalized hemostasis with FloSeal and patties. Valsalva maneuver to 40 mmHg   demonstrated no cerebrospinal fluid leak. Next, 500 mg of vancomycin powder was  placed in the deep space and deep fascia was closed with #1 Vicryl in a   figure-of-eight fashion. The superficial layer was then irrigated and closed   over a drain with 2-0 Vicryl, 3-0 Monocryl, Dermabond and Steri-Strips. A soft   dressing was placed. The patient was then flipped supine, extubated and transferred to the Recovery Room in stable condition.

## 2023-12-19 NOTE — DISCHARGE INSTRUCTIONS
DR. SATISH CEE'S POSTOPERATIVE INSTRUCTIONS -   LUMBAR DISKECTOMY       Antibiotics: You do not need additional antibiotics at home.    Wound Care: You may remove your dressing and shower 5 days after surgery. Until then please keep your wound clean and dry. Sponge baths are acceptable. Do not go in a pool or hot tub until seen in clinic. Please leave the small steri-strips covering your wound in place until they fall off naturally (2 weeks). You may notice clear suture ends hanging from the sides of your incision after the steri-strips are removed, it is ok to clip these with scissors.    Brace: You do not need a brace.    Pain: We will use a multimodal approach for pain management after your surgery.  You will be given a prescription for pain medicine when you are discharged from the hospital.  You will also be given prescriptions for Robaxin (a muscle relaxer), Gabapentin, Celebrex and Tylenol.  Please note: you will only be given ONE prescription for narcotics when you are discharged from the hospital.  This medication is for breakthrough pain only. This medication will not be refilled.  The other medications given to you may be refilled if needed.      Infection: Signs of infection include increasing wound drainage and redness around the wound, as well as a temperature over 101.5 degrees. It is unnecessary to take your temperature on a routine basis. Please call the above number if you are concerned about an infection.    Driving and Work: It is ok to return to driving and work as long as you are not taking narcotic pain medications. Please do not lift over 10 pounds or participate in exercise or sports until cleared by Dr. Cee.    Deep Venous Thrombosis (Blood Clots): Symptoms include swelling in the legs and shortness of breath. Please call the office or proceed to the nearest emergency room if you have any of these symptoms.    Physical Therapy: The best physical therapy after surgery is walking.  Please try to walk as much as possible.    Follow-up: You will be scheduled for a follow-up appointment in 4 weeks with either Dr. Cee or his physician assistant, Usha Watts PA-C.    Questions: During business hours please call [phone] for routine questions. For after hours questions please call [phone] and ask to speak with the Orthopaedic resident on call.    Disability: If you submitted short term disability paperwork for us to complete and would like to check the status, please call the Disability Department at [phone].  You may fax any necessary paperwork to [phone].

## 2023-12-19 NOTE — ANESTHESIA POSTPROCEDURE EVALUATION
Anesthesia Post Evaluation    Patient: Sade Ott    Procedure(s) Performed: Procedure(s) (LRB):  LAMINECTOMY, SPINE, LUMBAR, WITH DISCECTOMY L4/5 (N/A)    Final Anesthesia Type: general      Patient location during evaluation: [facility]  Patient participation: Yes- Able to Participate  Level of consciousness: awake and alert  Post-procedure vital signs: reviewed and stable  Pain management: adequate  Airway patency: patent  KENNY mitigation strategies: Extubation while patient is awake and Verification of full reversal of neuromuscular block  PONV status at discharge: No PONV  Anesthetic complications: no      Cardiovascular status: blood pressure returned to baseline  Respiratory status: unassisted  Hydration status: euvolemic  Follow-up not needed.              Vitals Value Taken Time   /98 12/19/23 1101   Temp 36.6 °C (97.9 °F) 12/19/23 0900   Pulse 85 12/19/23 1106   Resp 14 12/19/23 1106   SpO2 96 % 12/19/23 1106   Vitals shown include unvalidated device data.      No case tracking events are documented in the log.      Pain/Juan Francisco Score: Pain Rating Prior to Med Admin: 6 (12/19/2023 10:30 AM)  Juan Francisco Score: 9 (12/19/2023  9:15 AM)

## 2023-12-27 ENCOUNTER — PATIENT MESSAGE (OUTPATIENT)
Dept: ORTHOPEDICS | Facility: CLINIC | Age: 38
End: 2023-12-27
Payer: COMMERCIAL

## 2024-01-17 ENCOUNTER — OFFICE VISIT (OUTPATIENT)
Dept: ORTHOPEDICS | Facility: CLINIC | Age: 39
End: 2024-01-17
Payer: COMMERCIAL

## 2024-01-17 DIAGNOSIS — Z98.890 S/P LUMBAR DISCECTOMY: Primary | ICD-10-CM

## 2024-01-17 PROCEDURE — 1159F MED LIST DOCD IN RCRD: CPT | Mod: CPTII,S$GLB,, | Performed by: PHYSICIAN ASSISTANT

## 2024-01-17 PROCEDURE — 99999 PR PBB SHADOW E&M-EST. PATIENT-LVL III: CPT | Mod: PBBFAC,,, | Performed by: PHYSICIAN ASSISTANT

## 2024-01-17 PROCEDURE — 99024 POSTOP FOLLOW-UP VISIT: CPT | Mod: S$GLB,,, | Performed by: PHYSICIAN ASSISTANT

## 2024-01-17 NOTE — PROGRESS NOTES
Date: 01/17/2024    Supervising Physician: Hector Cee M.D.    Date of Surgery: 12/19/2023    Procedure: L4/5 discectomy    History: Sade Ott is seen today for follow-up following the above listed procedure. Overall the patient is doing well but today notes her pain is significantly improved.  She is pleased.   Shei s not taking anything for pain.  she denies fever, chills, and sweats since the time of the surgery.       Exam: Post op dressing taken down.  Incision is healing well, clean, dry and intact.   There is no sign of infection. Neuro exam is stable. No signs of DVT.    Radiographs: no new imaging today.    Assessment/Plan: 4 weeks post op.    Doing well postoperatively. No lifting over 10 lbs.     I will plan to see the patient back for the next postop visit in 2 months.     Thank you for the opportunity to participate in this patient's care. Please give me a call if there are any concerns or questions.

## 2024-01-29 ENCOUNTER — PATIENT MESSAGE (OUTPATIENT)
Dept: ORTHOPEDICS | Facility: CLINIC | Age: 39
End: 2024-01-29
Payer: COMMERCIAL

## 2024-02-16 ENCOUNTER — PATIENT MESSAGE (OUTPATIENT)
Dept: ADMINISTRATIVE | Facility: OTHER | Age: 39
End: 2024-02-16
Payer: COMMERCIAL

## 2024-03-20 ENCOUNTER — OFFICE VISIT (OUTPATIENT)
Dept: ORTHOPEDICS | Facility: CLINIC | Age: 39
End: 2024-03-20
Payer: COMMERCIAL

## 2024-03-20 VITALS — HEIGHT: 66 IN | WEIGHT: 178.63 LBS | BODY MASS INDEX: 28.71 KG/M2

## 2024-03-20 DIAGNOSIS — Z98.890 S/P LUMBAR DISCECTOMY: Primary | ICD-10-CM

## 2024-03-20 PROCEDURE — 99024 POSTOP FOLLOW-UP VISIT: CPT | Mod: S$GLB,,, | Performed by: PHYSICIAN ASSISTANT

## 2024-03-20 PROCEDURE — 1159F MED LIST DOCD IN RCRD: CPT | Mod: CPTII,S$GLB,, | Performed by: PHYSICIAN ASSISTANT

## 2024-03-20 PROCEDURE — 99999 PR PBB SHADOW E&M-EST. PATIENT-LVL III: CPT | Mod: PBBFAC,,, | Performed by: PHYSICIAN ASSISTANT

## 2024-03-20 NOTE — PROGRESS NOTES
Date: 03/20/2024    Supervising Physician: Hector Cee M.D.    Date of Surgery: 12/19/2023    Procedure: L4/5 discectomy    History: Sade Ott is seen today for follow-up following the above listed procedure. Overall the patient is doing well but today notes her pain is significantly improved.  She is pleased.   She is not taking anything for pain.        Exam: Incision is healed.   There is no sign of infection. Neuro exam is stable. No signs of DVT.    Radiographs: no new imaging today.    Assessment/Plan: 3 months post op.    Doing well postoperatively. She may progress activities as tolerated.    I will plan to see the patient back for the next postop visit at a year from surgery.    Thank you for the opportunity to participate in this patient's care. Please give me a call if there are any concerns or questions.

## 2024-04-12 ENCOUNTER — PATIENT MESSAGE (OUTPATIENT)
Dept: ORTHOPEDICS | Facility: CLINIC | Age: 39
End: 2024-04-12
Payer: COMMERCIAL

## 2024-07-27 ENCOUNTER — PATIENT MESSAGE (OUTPATIENT)
Dept: ORTHOPEDICS | Facility: CLINIC | Age: 39
End: 2024-07-27
Payer: COMMERCIAL

## 2024-09-10 ENCOUNTER — TELEPHONE (OUTPATIENT)
Dept: ORTHOPEDICS | Facility: CLINIC | Age: 39
End: 2024-09-10
Payer: COMMERCIAL

## 2024-09-10 NOTE — TELEPHONE ENCOUNTER
Attempt to contact patient regarding rescheduling her appointment due to the weather. Left number for patient to return call back to [phone]. Thanks.

## 2024-09-11 ENCOUNTER — PATIENT MESSAGE (OUTPATIENT)
Dept: ORTHOPEDICS | Facility: CLINIC | Age: 39
End: 2024-09-11
Payer: COMMERCIAL

## 2024-09-11 DIAGNOSIS — Z98.890 S/P LUMBAR DISCECTOMY: Primary | ICD-10-CM

## 2024-09-12 ENCOUNTER — OFFICE VISIT (OUTPATIENT)
Dept: ORTHOPEDICS | Facility: CLINIC | Age: 39
End: 2024-09-12
Payer: COMMERCIAL

## 2024-09-12 ENCOUNTER — HOSPITAL ENCOUNTER (OUTPATIENT)
Dept: RADIOLOGY | Facility: HOSPITAL | Age: 39
Discharge: HOME | End: 2024-09-12
Attending: ORTHOPAEDIC SURGERY
Payer: COMMERCIAL

## 2024-09-12 DIAGNOSIS — M54.9 DORSALGIA, UNSPECIFIED: Primary | ICD-10-CM

## 2024-09-12 DIAGNOSIS — Z98.890 S/P LUMBAR DISCECTOMY: ICD-10-CM

## 2024-09-12 DIAGNOSIS — M54.16 LUMBAR RADICULOPATHY: ICD-10-CM

## 2024-09-12 DIAGNOSIS — M54.9 DORSALGIA, UNSPECIFIED: ICD-10-CM

## 2024-09-12 PROCEDURE — 1159F MED LIST DOCD IN RCRD: CPT | Mod: CPTII,S$GLB,, | Performed by: ORTHOPAEDIC SURGERY

## 2024-09-12 PROCEDURE — 72148 MRI LUMBAR SPINE W/O DYE: CPT | Mod: TC

## 2024-09-12 PROCEDURE — 72110 X-RAY EXAM L-2 SPINE 4/>VWS: CPT | Mod: 26,,, | Performed by: RADIOLOGY

## 2024-09-12 PROCEDURE — 99999 PR PBB SHADOW E&M-EST. PATIENT-LVL III: CPT | Mod: PBBFAC,,, | Performed by: ORTHOPAEDIC SURGERY

## 2024-09-12 PROCEDURE — 72148 MRI LUMBAR SPINE W/O DYE: CPT | Mod: 26,,, | Performed by: RADIOLOGY

## 2024-09-12 PROCEDURE — 99214 OFFICE O/P EST MOD 30 MIN: CPT | Mod: S$GLB,,, | Performed by: ORTHOPAEDIC SURGERY

## 2024-09-12 PROCEDURE — 72110 X-RAY EXAM L-2 SPINE 4/>VWS: CPT | Mod: TC

## 2024-09-12 RX ORDER — SUMATRIPTAN 50 MG/1
50 TABLET, FILM COATED ORAL
COMMUNITY
Start: 2024-07-09

## 2024-09-12 RX ORDER — ESTRADIOL 1 MG/1
1 TABLET ORAL
COMMUNITY
Start: 2024-07-08

## 2024-09-12 RX ORDER — METHOCARBAMOL 750 MG/1
750 TABLET, FILM COATED ORAL NIGHTLY PRN
Qty: 30 TABLET | Refills: 0 | Status: SHIPPED | OUTPATIENT
Start: 2024-09-12 | End: 2024-09-13 | Stop reason: SDUPTHER

## 2024-09-12 RX ORDER — METHYLPREDNISOLONE 4 MG/1
TABLET ORAL
Qty: 1 EACH | Refills: 0 | Status: SHIPPED | OUTPATIENT
Start: 2024-09-12 | End: 2024-09-13

## 2024-09-12 RX ORDER — DOXYCYCLINE 40 MG/1
CAPSULE ORAL
COMMUNITY
Start: 2024-07-23

## 2024-09-12 RX ORDER — ESCITALOPRAM OXALATE 20 MG/1
20 TABLET ORAL
COMMUNITY

## 2024-09-12 RX ORDER — BUTALBITAL, ACETAMINOPHEN AND CAFFEINE 50; 325; 40 MG/1; MG/1; MG/1
1 TABLET ORAL EVERY 4 HOURS PRN
COMMUNITY

## 2024-09-12 RX ORDER — MELOXICAM 15 MG/1
15 TABLET ORAL DAILY
Qty: 30 TABLET | Refills: 0 | Status: SHIPPED | OUTPATIENT
Start: 2024-09-12 | End: 2024-09-13 | Stop reason: SDUPTHER

## 2024-09-12 RX ORDER — LORAZEPAM 2 MG/1
2 TABLET ORAL EVERY 8 HOURS PRN
COMMUNITY
Start: 2023-09-20

## 2024-09-12 NOTE — PROGRESS NOTES
Date: 09/12/2024    Supervising Physician: Hector Cee M.D.     Date of Surgery: 12/19/2023     Procedure: L4/5 discectomy    History: Sade Ott is seen today for follow-up following the above listed procedure. Overall the patient is doing well but today notes she did very well with surgery last year but for the past few months she has been having significant low back and right thigh pain. The pain is worse with bending and lifting and improved by nothing. She endorses numbness and tingling. She has continued home exercises for 8 weeks in the past 3 months. Dr. Cee provided the patient with a home exercise program. It is the [employer] spine conditioning program. Exercises include head rolls, kneeling back extension, sitting rotation stretch, modified seated side straddle, knee to chest, bird dog, plank, modified seated plank, hip bridges, abdominal bracing, and abdominal crunch. Pt completes each exercise daily for 1 hour with worsening of pain. Pain is 10/10.    Exam:   Antalgic limping station and gait, no difficulty with toe or heel walk.   Dorsal lumbar skin negative for rashes, lesions, hairy patches and surgical scars. There is mild lumbar tenderness to palpation.  Lumbar range of motion is acceptable.  Global saggital and coronal spinal balance acceptable, not significant for scoliosis and kyphosis.  No pain with the range of motion of the bilateral hips. No trochanteric tenderness to palpation.  Bilateral lower extremities warm and well perfused, dorsalis pedis pulses 2+ bilaterally.  4/5 strength and tone in all major motor groups in the RLE compared to LLE. Normal sensation to light touch in the L2-S1 dermatomes bilaterally.  Deep tendon reflexes symmetric 2+ in the bilateral lower extremities.  Negative Babinski bilaterally. Straight leg raise positive on right    Radiographs: mild degenerative changes    Assessment/Plan:     Pt presents with chronic lumbar radiculopathy with  neuro deficits after discectomy. Failure of conservative rx. Will obtain STAT MRI to further evaluate and call with results. Will send medrol dose pack, mobic, robaxin to pharmacy

## 2024-09-12 NOTE — H&P (VIEW-ONLY)
Established Patient - Audio Only Telehealth Visit     The patient location is: home  The chief complaint leading to consultation is: MRI results  Visit type: Virtual visit with audio only (telephone)  Total time spent with patient: 10 min       The reason for the audio only service rather than synchronous audio and video virtual visit was related to technical difficulties or patient preference/necessity.     Each patient to whom I provide medical services by telemedicine is:  (1) informed of the relationship between the physician and patient and the respective role of any other health care provider with respect to management of the patient; and (2) notified that they may decline to receive medical services by telemedicine and may withdraw from such care at any time. Patient verbally consented to receive this service via voice-only telephone call.    DATE: 9/12/2024  PATIENT: Sade Ott    Attending Physician: Ward Clarke M.D.    Date of Surgery: 12/19/2023     Procedure: L4/5 discectomy (Dr. Cee)    HISTORY:  Sade Ott is a 39 y.o. female who returns to me today for MRI results.  She was last seen by me 9/12/2024.  Today she is doing well but notes she did very well with surgery last year but for the past few months she has been having significant low back and right thigh pain. The pain is worse with bending and lifting and improved by nothing. She endorses numbness and tingling. She has continued home exercises for 8 weeks in the past 3 months. Dr. Cee provided the patient with a home exercise program. It is the [employer] spine conditioning program. Exercises include head rolls, kneeling back extension, sitting rotation stretch, modified seated side straddle, knee to chest, bird dog, plank, modified seated plank, hip bridges, abdominal bracing, and abdominal crunch. Pt completes each exercise daily for 1 hour with worsening of pain. Pain is 10/10.     The Patient  "denies myelopathic symptoms such as handwriting changes or difficulty with buttons/coins/keys. Denies perineal paresthesias, bowel/bladder dysfunction.      EXAM:  There were no vitals taken for this visit.    My physical examination was notable for the following findings:     Musculoskeletal and neuro exam stable      IMAGING:    Today I personally re- reviewed AP, Lat and Flex/Ex  upright L-spine that demonstrate  mild degenerative changes    MRI lumbar demonstrates recurrent central/right paracentral zone disc protrusion at the right L4-5 level, less pronounced when compared to prior exam 10/10/2023 however it causes severe right lateral recess stenosis and contacts the descending L5 nerve root.     There is no height or weight on file to calculate BMI.    No results found for: "HGBA1C"      ASSESSMENT/PLAN:    There are no diagnoses linked to this encounter.    Today we discussed at length all of the different treatment options including anti-inflammatories, acetaminophen, rest, ice, heat, physical therapy including strengthening and stretching exercises, home exercises, ROM, aerobic conditioning, aqua therapy, other modalities including ultrasound, massage, and dry needling, epidural steroid injections and finally surgical intervention.      Pt presents with chronic lumbar radiculopathy. Failure of conservative rx. Will order right L4-5 TFESI with pain management.                       This service was not originating from a related E/M service provided within the previous 7 days nor will  to an E/M service or procedure within the next 24 hours or my soonest available appointment.  Prevailing standard of care was able to be met in this audio-only visit.          "

## 2024-09-13 ENCOUNTER — OFFICE VISIT (OUTPATIENT)
Dept: ORTHOPEDICS | Facility: CLINIC | Age: 39
End: 2024-09-13
Attending: ORTHOPAEDIC SURGERY
Payer: COMMERCIAL

## 2024-09-13 ENCOUNTER — PATIENT MESSAGE (OUTPATIENT)
Dept: PAIN MEDICINE | Facility: OTHER | Age: 39
End: 2024-09-13
Payer: COMMERCIAL

## 2024-09-13 DIAGNOSIS — M54.16 LUMBAR RADICULOPATHY: Primary | ICD-10-CM

## 2024-09-13 RX ORDER — METHOCARBAMOL 750 MG/1
750 TABLET, FILM COATED ORAL NIGHTLY PRN
Qty: 30 TABLET | Refills: 0 | Status: SHIPPED | OUTPATIENT
Start: 2024-09-13 | End: 2024-10-13

## 2024-09-13 RX ORDER — METHYLPREDNISOLONE 4 MG/1
TABLET ORAL
Qty: 21 EACH | Refills: 0 | Status: SHIPPED | OUTPATIENT
Start: 2024-09-13 | End: 2024-10-04

## 2024-09-13 RX ORDER — MELOXICAM 15 MG/1
15 TABLET ORAL DAILY
Qty: 30 TABLET | Refills: 0 | Status: SHIPPED | OUTPATIENT
Start: 2024-09-13

## 2024-09-30 ENCOUNTER — PATIENT MESSAGE (OUTPATIENT)
Dept: ORTHOPEDICS | Facility: CLINIC | Age: 39
End: 2024-09-30
Payer: COMMERCIAL

## 2024-09-30 RX ORDER — PREGABALIN 100 MG/1
100 CAPSULE ORAL 2 TIMES DAILY
Qty: 60 CAPSULE | Refills: 5 | Status: SHIPPED | OUTPATIENT
Start: 2024-09-30 | End: 2025-03-31

## 2024-10-08 ENCOUNTER — PATIENT MESSAGE (OUTPATIENT)
Dept: ORTHOPEDICS | Facility: CLINIC | Age: 39
End: 2024-10-08
Payer: COMMERCIAL

## 2024-10-11 ENCOUNTER — HOSPITAL ENCOUNTER (OUTPATIENT)
Facility: OTHER | Age: 39
Discharge: HOME | End: 2024-10-11
Attending: ANESTHESIOLOGY | Admitting: ANESTHESIOLOGY
Payer: COMMERCIAL

## 2024-10-11 VITALS
TEMPERATURE: 98 F | DIASTOLIC BLOOD PRESSURE: 93 MMHG | OXYGEN SATURATION: 97 % | HEART RATE: 85 BPM | HEIGHT: 65 IN | BODY MASS INDEX: 26.66 KG/M2 | WEIGHT: 160 LBS | RESPIRATION RATE: 18 BRPM | SYSTOLIC BLOOD PRESSURE: 142 MMHG

## 2024-10-11 DIAGNOSIS — G89.29 CHRONIC PAIN: ICD-10-CM

## 2024-10-11 DIAGNOSIS — M54.16 LUMBAR RADICULOPATHY: Primary | ICD-10-CM

## 2024-10-11 PROCEDURE — 64483 NJX AA&/STRD TFRM EPI L/S 1: CPT | Mod: RT,,, | Performed by: ANESTHESIOLOGY

## 2024-10-11 PROCEDURE — 64483 NJX AA&/STRD TFRM EPI L/S 1: CPT | Mod: RT | Performed by: ANESTHESIOLOGY

## 2024-10-11 PROCEDURE — 25500020 PHARM REV CODE 255: Performed by: ANESTHESIOLOGY

## 2024-10-11 PROCEDURE — 63600175 PHARM REV CODE 636 W HCPCS: Performed by: ANESTHESIOLOGY

## 2024-10-11 RX ORDER — LIDOCAINE HYDROCHLORIDE 10 MG/ML
INJECTION, SOLUTION EPIDURAL; INFILTRATION; INTRACAUDAL; PERINEURAL
Status: DISCONTINUED | OUTPATIENT
Start: 2024-10-11 | End: 2024-10-11 | Stop reason: HOSPADM

## 2024-10-11 RX ORDER — MIDAZOLAM HYDROCHLORIDE 1 MG/ML
INJECTION INTRAMUSCULAR; INTRAVENOUS
Status: DISCONTINUED | OUTPATIENT
Start: 2024-10-11 | End: 2024-10-11 | Stop reason: HOSPADM

## 2024-10-11 RX ORDER — FENTANYL CITRATE 50 UG/ML
INJECTION, SOLUTION INTRAMUSCULAR; INTRAVENOUS
Status: DISCONTINUED | OUTPATIENT
Start: 2024-10-11 | End: 2024-10-11 | Stop reason: HOSPADM

## 2024-10-11 RX ORDER — DEXAMETHASONE SODIUM PHOSPHATE 10 MG/ML
INJECTION INTRAMUSCULAR; INTRAVENOUS
Status: DISCONTINUED | OUTPATIENT
Start: 2024-10-11 | End: 2024-10-11 | Stop reason: HOSPADM

## 2024-10-11 RX ORDER — LIDOCAINE HYDROCHLORIDE 20 MG/ML
INJECTION, SOLUTION INFILTRATION; PERINEURAL
Status: DISCONTINUED | OUTPATIENT
Start: 2024-10-11 | End: 2024-10-11 | Stop reason: HOSPADM

## 2024-10-11 RX ORDER — SODIUM CHLORIDE 9 MG/ML
INJECTION, SOLUTION INTRAVENOUS CONTINUOUS
Status: DISCONTINUED | OUTPATIENT
Start: 2024-10-11 | End: 2024-10-11 | Stop reason: HOSPADM

## 2024-10-11 NOTE — OP NOTE
Lumbar Transforaminal Epidural Steroid Injection under Fluoroscopic Guidance    The procedure, risks, benefits, and options were discussed with the patient. There are no contraindications to the procedure. The patent expressed understanding and agreed to the procedure. Informed written consent was obtained prior to the start of the procedure and can be found in the patient's chart.    PATIENT NAME: Sade Ott   MRN: [id number]     DATE OF PROCEDURE: 10/11/2024    PROCEDURE:  Right  L4/5 Lumbar Transforaminal Epidural Steroid Injection under Fluoroscopic Guidance    PRE-OP DIAGNOSIS: Lumbar radiculopathy [M54.16] Lumbar radiculopathy [M54.16]    POST-OP DIAGNOSIS: Same    PHYSICIAN: Bry Bernabe MD    ASSISTANTS: Son Rebollar MD  [facility] pain fellow     MEDICATIONS INJECTED: Preservative-free Decadron 10mg with 5cc of Lidocaine 1% MPF     LOCAL ANESTHETIC INJECTED: Xylocaine 2%     SEDATION: Versed 2mg and Fentanyl 75mcg                                                                                                                                                                                     Conscious sedation ordered by M.D. Patient re-evaluation prior to administration of conscious sedation. No changes noted in patient's status from initial evaluation. The patient's vital signs were monitored by RN and patient remained hemodynamically stable throughout the procedure.    Event Time In   Sedation Start 0826   Sedation End 0830       ESTIMATED BLOOD LOSS: None    COMPLICATIONS: None    TECHNIQUE: Time-out was performed to identify the patient and procedure to be performed. With the patient laying in a prone position, the surgical area was prepped and draped in the usual sterile fashion using ChloraPrep and a fenestrated drape.The levels were determined under fluoroscopy guidance. Skin anesthesia was achieved by injecting Lidocaine 2% over the injection sites. The transforaminal spaces were then  approached with a 25 gauge, 3.5 inch spinal quinke needle that was introduced under fluoroscopic guidance in the AP and Lateral views. Once the needle tip was in the area of the transforaminal space, and there was no blood, CSF or paraesthesias, contrast dye Omnipaque (300mg/mL) was injected to confirm placement and there was no vascular runoff. Fluoroscopic imaging in the AP and lateral views revealed a clear outline of the spinal nerve with proximal spread of agent through the neural foramen into the epidural space. 3 mL of the medication mixture listed above was injected slowly at each site. Displacement of the radio opaque contrast after injection of the medication confirmed that the medication went into the area of the transforaminal spaces. The needles were removed and bleeding was nil. A sterile dressing was applied. No specimens collected. The patient tolerated the procedure well.     The patient was monitored after the procedure in the recovery area. They were given post-procedure and discharge instructions to follow at home. The patient was discharged in a stable condition.    Son Rebollar MD    I reviewed and edited the fellow's note. I conducted my own interview and physical examination. I agree with the findings. I was present and supervising all critical portions of the procedure.    Bry Bernabe MD

## 2024-10-11 NOTE — DISCHARGE INSTRUCTIONS

## 2025-01-08 NOTE — PATIENT INSTRUCTIONS
Access Code: [email or web address]  URL: [email or web address]  Date: 10/11/2022  Prepared by: Sandra Garza    Exercises  Supine Bridge with Gluteal Set and Spinal Articulation - 1 x daily - 1 sets - 10 reps - 10 hold  Seated Hamstring Stretch - 1 sets - 3 reps - 30 seconds hold  Supine Double Knee to Chest - 1 sets - 3 reps - 30 seconds hold  Supine Posterior Pelvic Tilt - 1 x daily - 20 reps - 5 hold  Seated Transversus Abdominis Bracing - 1 x daily - 1 sets - 20 reps - 3 hold    
Normal for race

## 2025-01-31 NOTE — PROGRESS NOTES
The patient presents for preop.  She has symptomatic lumbar radiculopathy and we are planning a left-sided L4-5 discectomy.    I had a sit down discussion with the patient regarding the planned procedure.  We specifically discussed the risks, benefits, and alternatives to surgery. We discussed the surgical procedure including the skin incision, discectomy and nerve decompression: they understand the risks include but are not limited to bleeding, infection, damage to arteries, veins and nerves, re-herniation, death, paralysis, blindness, spinal fluid leak, continued or worsening pain, the possible need for more surgery in the future as well as the possibility other unforseen and unknown complications. We talked about expected hospital stay and recovery period. All questions were answered; they understand and wish to proceed.       
Unknown

## 2025-03-10 ENCOUNTER — PATIENT MESSAGE (OUTPATIENT)
Dept: ORTHOPEDICS | Facility: CLINIC | Age: 40
End: 2025-03-10
Payer: COMMERCIAL

## 2025-03-10 DIAGNOSIS — M54.16 LUMBAR RADICULOPATHY: Primary | ICD-10-CM

## 2025-08-01 ENCOUNTER — HOSPITAL ENCOUNTER (OUTPATIENT)
Dept: RADIOLOGY | Facility: HOSPITAL | Age: 40
Discharge: HOME | End: 2025-08-01
Attending: ORTHOPAEDIC SURGERY
Payer: COMMERCIAL

## 2025-08-01 DIAGNOSIS — M54.16 LUMBAR RADICULOPATHY: ICD-10-CM

## 2025-08-01 PROCEDURE — 72110 X-RAY EXAM L-2 SPINE 4/>VWS: CPT | Mod: TC

## 2025-08-01 PROCEDURE — 72110 X-RAY EXAM L-2 SPINE 4/>VWS: CPT | Mod: 26,,, | Performed by: RADIOLOGY

## 2025-08-14 ENCOUNTER — OFFICE VISIT (OUTPATIENT)
Dept: ORTHOPEDICS | Facility: CLINIC | Age: 40
End: 2025-08-14
Payer: COMMERCIAL

## 2025-08-14 VITALS — BODY MASS INDEX: 23.83 KG/M2 | WEIGHT: 143.06 LBS | HEIGHT: 65 IN

## 2025-08-14 DIAGNOSIS — M51.26 LUMBAR DISC HERNIATION: ICD-10-CM

## 2025-08-14 DIAGNOSIS — Z98.890 S/P LUMBAR DISCECTOMY: Primary | ICD-10-CM

## 2025-08-14 PROCEDURE — 99999 PR PBB SHADOW E&M-EST. PATIENT-LVL III: CPT | Mod: PBBFAC,,, | Performed by: ORTHOPAEDIC SURGERY

## 2025-08-14 RX ORDER — MELOXICAM 15 MG/1
15 TABLET ORAL DAILY PRN
Qty: 30 TABLET | Refills: 0 | Status: SHIPPED | OUTPATIENT
Start: 2025-08-14

## 2025-08-14 RX ORDER — METHOCARBAMOL 750 MG/1
750 TABLET, FILM COATED ORAL NIGHTLY PRN
Qty: 30 TABLET | Refills: 0 | Status: SHIPPED | OUTPATIENT
Start: 2025-08-14 | End: 2025-09-13

## (undated) DEVICE — TUBE FRAZIER 5MM 2FT SOFT TIP

## (undated) DEVICE — DRESSING AQUACEL FOAM 5 X 5

## (undated) DEVICE — CLIPPER BLADE MOD 4406 (CAREF)

## (undated) DEVICE — DRESSING ABSRBNT ISLAND 3.6X8

## (undated) DEVICE — DRESSING TRANS 4X4 TEGADERM

## (undated) DEVICE — DRAPE TOP 53X102IN

## (undated) DEVICE — DRAPE C-ARMOR EQUIPMENT COVER

## (undated) DEVICE — SYS CLSR DERMABOND PRINEO 22CM

## (undated) DEVICE — SUT STRATAFIX 3-0 30CM

## (undated) DEVICE — DRAPE ABDOMINAL TIBURON 14X11

## (undated) DEVICE — NDL HYPO REG 25G X 1 1/2

## (undated) DEVICE — CORD BIPOLAR 12 FOOT

## (undated) DEVICE — ELECTRODE BLADE INSULATED 1 IN

## (undated) DEVICE — STRIP MEDI WND CLSR 1X5IN

## (undated) DEVICE — TIP YANKAUERS BULB NO VENT

## (undated) DEVICE — DRESSING MEPILEX BORDER 4 X 4

## (undated) DEVICE — NDL SPINAL 18GX3.5 SPINOCAN

## (undated) DEVICE — BUR BONE CUT MICRO TPS 3X3.8MM

## (undated) DEVICE — GOWN POLY REINF BRTH SLV XL

## (undated) DEVICE — DRESSING SURGICAL 1/2X1/2

## (undated) DEVICE — Device

## (undated) DEVICE — KIT SPINAL PATIENT CARE JACK

## (undated) DEVICE — SUT VICRYL+ 1 CT1 18IN

## (undated) DEVICE — ELECTRODE REM PLYHSV RETURN 9

## (undated) DEVICE — SYR IRRIGATION BULB STER 60ML

## (undated) DEVICE — APPLICATOR CHLORAPREP ORN 26ML

## (undated) DEVICE — CLOSURE SKIN 1X5 STERI-STRIP

## (undated) DEVICE — MARKER SKIN STND TIP BLUE BARR

## (undated) DEVICE — TRAY NEURO OMC

## (undated) DEVICE — KIT SURGIFLO HEMOSTATIC MATRIX

## (undated) DEVICE — DRAPE C-ARM ELAS CLIP 42X120IN

## (undated) DEVICE — SUTURE STRATAFIX PGA PCL 3-0

## (undated) DEVICE — NDL HYPO 27G X 1 1/2

## (undated) DEVICE — DRAPE STERI-DRAPE 1000 17X11IN

## (undated) DEVICE — SUT VICRYL PLUS 2-0 CT1 18